# Patient Record
Sex: FEMALE | Race: WHITE | Employment: OTHER
[De-identification: names, ages, dates, MRNs, and addresses within clinical notes are randomized per-mention and may not be internally consistent; named-entity substitution may affect disease eponyms.]

---

## 2021-03-25 ENCOUNTER — NURSE TRIAGE (OUTPATIENT)
Dept: OTHER | Facility: CLINIC | Age: 56
End: 2021-03-25

## 2021-03-25 NOTE — TELEPHONE ENCOUNTER
Patient called [facility] with red flag complaint to establish care with [facility] or [facility]. Brief description of triage: Vertigo since Saturday night. Triage indicates for patient to see PCP within 24 hours. Advised patient to go to urgent care today if unable to get appointment. Care advice provided, patient verbalizes understanding; denies any other questions or concerns; instructed to call back for any new or worsening symptoms. Writer provided warm transfer to [facility] at [facility] for appointment scheduling. Attention Provider: Thank you for allowing me to participate in the care of your patient. The patient was connected to triage in response to information provided to the [facility]. Please do not respond through this encounter as the response is not directed to a shared pool. Unable to route to PCP. Reason for Disposition   [1] MODERATE dizziness (e.g., vertigo; feels very unsteady, interferes with normal activities) AND [2] has NOT been evaluated by physician for this    Answer Assessment - Initial Assessment Questions  1. DESCRIPTION: \"Describe your dizziness. \"      Sitting up straight, very tired, feels nauseous and dizzy when standing up or moving, feels spinning sensation when lying down    2. VERTIGO: \"Do you feel like either you or the room is spinning or tilting?\"       Spinning when lying down    3. LIGHTHEADED: \"Do you feel lightheaded? \" (e.g., somewhat faint, woozy, weak upon standing)      A little bit    4. SEVERITY: \"How bad is it? \"  \"Can you walk? \"    - MILD - Feels unsteady but walking normally. - MODERATE - Feels very unsteady when walking, but not falling; interferes with normal activities (e.g., school, work) . - SEVERE - Unable to walk without falling (requires assistance). Moderate - drifting to one side when trying to walk    5. ONSET:  \"When did the dizziness begin? \"      Saturday night    6.  AGGRAVATING FACTORS: \"Does anything make it worse? \" (e.g., standing, change in head position)      Moving, standing, lying down    7. CAUSE: \"What do you think is causing the dizziness? \"      Vertigo    8. RECURRENT SYMPTOM: \"Have you had dizziness before? \" If so, ask: \"When was the last time? \" \"What happened that time? \"      Yes    9. OTHER SYMPTOMS: \"Do you have any other symptoms? \" (e.g., headache, weakness, numbness, vomiting, earache)      No    10. PREGNANCY: \"Is there any chance you are pregnant? \" \"When was your last menstrual period? \"        N/A    Protocols used: DIZZINESS - VERTIGO-ADULT-AH

## 2021-03-26 ENCOUNTER — HOSPITAL ENCOUNTER (EMERGENCY)
Age: 56
Discharge: HOME | End: 2021-03-26
Attending: EMERGENCY MEDICINE
Payer: COMMERCIAL

## 2021-03-26 ENCOUNTER — APPOINTMENT (OUTPATIENT)
Dept: CT IMAGING | Age: 56
End: 2021-03-26
Payer: COMMERCIAL

## 2021-03-26 VITALS
SYSTOLIC BLOOD PRESSURE: 170 MMHG | BODY MASS INDEX: 44.39 KG/M2 | HEART RATE: 78 BPM | HEIGHT: 64 IN | TEMPERATURE: 98.1 F | RESPIRATION RATE: 14 BRPM | DIASTOLIC BLOOD PRESSURE: 85 MMHG | WEIGHT: 260 LBS | OXYGEN SATURATION: 98 %

## 2021-03-26 DIAGNOSIS — R42 DIZZINESS: Primary | ICD-10-CM

## 2021-03-26 LAB
A/G RATIO: 1.4 (ref 1.1–2.2)
ALBUMIN SERPL-MCNC: 4.4 G/DL (ref 3.4–5)
ALP BLD-CCNC: 105 U/L (ref 40–129)
ALT SERPL-CCNC: 37 U/L (ref 10–40)
ANION GAP SERPL CALCULATED.3IONS-SCNC: 10 MMOL/L (ref 3–16)
AST SERPL-CCNC: 33 U/L (ref 15–37)
BASOPHILS ABSOLUTE: 0.1 K/UL (ref 0–0.2)
BASOPHILS RELATIVE PERCENT: 1.4 %
BILIRUB SERPL-MCNC: 0.3 MG/DL (ref 0–1)
BILIRUBIN URINE: NEGATIVE
BLOOD, URINE: NEGATIVE
BUN BLDV-MCNC: 13 MG/DL (ref 7–20)
CALCIUM SERPL-MCNC: 9.9 MG/DL (ref 8.3–10.6)
CHLORIDE BLD-SCNC: 100 MMOL/L (ref 99–110)
CLARITY: CLEAR
CO2: 27 MMOL/L (ref 21–32)
COLOR: YELLOW
CREAT SERPL-MCNC: 0.7 MG/DL (ref 0.6–1.1)
EOSINOPHILS ABSOLUTE: 0.4 K/UL (ref 0–0.6)
EOSINOPHILS RELATIVE PERCENT: 3.7 %
GFR AFRICAN AMERICAN: >60
GFR NON-AFRICAN AMERICAN: >60
GLOBULIN: 3.1 G/DL
GLUCOSE BLD-MCNC: 126 MG/DL (ref 70–99)
GLUCOSE URINE: NEGATIVE MG/DL
HCT VFR BLD CALC: 37.7 % (ref 36–48)
HEMOGLOBIN: 12.9 G/DL (ref 12–16)
KETONES, URINE: NEGATIVE MG/DL
LEUKOCYTE ESTERASE, URINE: NEGATIVE
LYMPHOCYTES ABSOLUTE: 2.5 K/UL (ref 1–5.1)
LYMPHOCYTES RELATIVE PERCENT: 25.9 %
MCH RBC QN AUTO: 31.9 PG (ref 26–34)
MCHC RBC AUTO-ENTMCNC: 34.2 G/DL (ref 31–36)
MCV RBC AUTO: 93 FL (ref 80–100)
MICROSCOPIC EXAMINATION: NORMAL
MONOCYTES ABSOLUTE: 0.6 K/UL (ref 0–1.3)
MONOCYTES RELATIVE PERCENT: 6.3 %
NEUTROPHILS ABSOLUTE: 6.1 K/UL (ref 1.7–7.7)
NEUTROPHILS RELATIVE PERCENT: 62.7 %
NITRITE, URINE: NEGATIVE
PDW BLD-RTO: 13.4 % (ref 12.4–15.4)
PH UA: 5 (ref 5–8)
PLATELET # BLD: 290 K/UL (ref 135–450)
PMV BLD AUTO: 8.8 FL (ref 5–10.5)
POTASSIUM REFLEX MAGNESIUM: 4.1 MMOL/L (ref 3.5–5.1)
PRO-BNP: 43 PG/ML (ref 0–124)
PROTEIN UA: NEGATIVE MG/DL
RBC # BLD: 4.06 M/UL (ref 4–5.2)
SODIUM BLD-SCNC: 137 MMOL/L (ref 136–145)
SPECIFIC GRAVITY UA: 1.02 (ref 1–1.03)
TOTAL PROTEIN: 7.5 G/DL (ref 6.4–8.2)
TROPONIN: <0.01 NG/ML
URINE REFLEX TO CULTURE: NORMAL
URINE TYPE: NORMAL
UROBILINOGEN, URINE: 0.2 E.U./DL
WBC # BLD: 9.8 K/UL (ref 4–11)

## 2021-03-26 PROCEDURE — 70450 CT HEAD/BRAIN W/O DYE: CPT

## 2021-03-26 PROCEDURE — 70498 CT ANGIOGRAPHY NECK: CPT

## 2021-03-26 PROCEDURE — 6360000004 HC RX CONTRAST MEDICATION: Performed by: EMERGENCY MEDICINE

## 2021-03-26 PROCEDURE — 84484 ASSAY OF TROPONIN QUANT: CPT

## 2021-03-26 PROCEDURE — 2580000003 HC RX 258: Performed by: EMERGENCY MEDICINE

## 2021-03-26 PROCEDURE — 85025 COMPLETE CBC W/AUTO DIFF WBC: CPT

## 2021-03-26 PROCEDURE — 81003 URINALYSIS AUTO W/O SCOPE: CPT

## 2021-03-26 PROCEDURE — 83880 ASSAY OF NATRIURETIC PEPTIDE: CPT

## 2021-03-26 PROCEDURE — 80053 COMPREHEN METABOLIC PANEL: CPT

## 2021-03-26 PROCEDURE — 93005 ELECTROCARDIOGRAM TRACING: CPT | Performed by: EMERGENCY MEDICINE

## 2021-03-26 PROCEDURE — 99283 EMERGENCY DEPT VISIT LOW MDM: CPT

## 2021-03-26 RX ORDER — MECLIZINE HYDROCHLORIDE 25 MG/1
25 TABLET ORAL 3 TIMES DAILY PRN
Qty: 15 TABLET | Refills: 0 | Status: SHIPPED | OUTPATIENT
Start: 2021-03-26 | End: 2021-03-31

## 2021-03-26 RX ORDER — MECLIZINE HCL 12.5 MG/1
25 TABLET ORAL ONCE
Status: COMPLETED | OUTPATIENT
Start: 2021-03-26 | End: 2021-03-26

## 2021-03-26 RX ORDER — 0.9 % SODIUM CHLORIDE 0.9 %
1000 INTRAVENOUS SOLUTION INTRAVENOUS ONCE
Status: COMPLETED | OUTPATIENT
Start: 2021-03-26 | End: 2021-03-26

## 2021-03-26 RX ADMIN — MECLIZINE HCL 25 MG: 12.5 TABLET ORAL at 20:04

## 2021-03-26 RX ADMIN — SODIUM CHLORIDE 1000 ML: 9 INJECTION, SOLUTION INTRAVENOUS at 20:04

## 2021-03-26 RX ADMIN — IOPAMIDOL 75 ML: 755 INJECTION, SOLUTION INTRAVENOUS at 19:54

## 2021-03-26 ASSESSMENT — ENCOUNTER SYMPTOMS
VOMITING: 0
WHEEZING: 0
DIARRHEA: 0
CHEST TIGHTNESS: 0
COUGH: 0
ABDOMINAL PAIN: 0
SHORTNESS OF BREATH: 0
STRIDOR: 0

## 2021-03-26 NOTE — ED NOTES
Pt state that she has been dealing with vertigo off and on for 6 months and that usually she goes to the chiropractor and it is gone in a couple of days. Pt state that yesterday she went on a long drive with some friend that triggered her vertigo back up so she made and apt and went to her chiropractor today. Pt state that he did her normal treatment. Pt state that after her treatment she felt flush and her mom was concerned so she called urgent care and they told her to come to the ER to get checked out. Pt state that she no longer feels flush and her vertigo is almost completely gone she states maybe just a little if she turns quickly. No room spinning and no dizziness when she looks side to side only if she does it fast and then a small amount. Pt state normally after her treatment by day 2 it is all gone.          Aydee Millard, MICHAEL  03/26/21 0392

## 2021-03-26 NOTE — ED PROVIDER NOTES
[facility]  ED  EMERGENCYDEPARTMENT ENCOUNTER      Pt Name: Dylan Gandhi  MRN: [id number]  [place] 1965  Date of evaluation: 3/26/2021  Richmond Cole MD    CHIEF COMPLAINT       Chief Complaint   Patient presents with    Dizziness     Pt states she has had vertigo since last weekend. Pt states she went to chiropractor at [phone] today for tx for vertigo. States she was flushed after treatment. Does not feel flushed now, feels better, but Urgent Care told her to come for eval.         HISTORY OF PRESENT ILLNESS   (Location/Symptom, Timing/Onset,Context/Setting, Quality, Duration, Modifying Factors, Severity)  Note limiting factors. Dylan Gandhi is a 63 y.o. female who presents to the emergency department for dizziness. Patient reports she's had dizziness since Saturday night, seems to come and go, aggravated with bending down, laying down or trying to get up. She went to chiropractor today to get neck adjusted and afterwards states she was 'really flushed' and felt worse. Called urgent care and was told to go to the ER to get checked. Currently states dizziness has improved though still there.   -States she has history of vertigo (off and on for six months), described as room spinning and when it started on Saturday it felt similar though seemed more severe. Reports nausea, denies emesis, chest pain, shortness of breath, weakness, blurry vision, numbness    HPI    Nursing Notes were reviewed. REVIEW OF SYSTEMS    (2-9 systems for level 4, 10 or more for level 5)     Review of Systems   Constitutional: Negative for activity change, appetite change, diaphoresis and fever. Respiratory: Negative for cough, chest tightness, shortness of breath, wheezing and stridor. Cardiovascular: Negative for chest pain and palpitations. Gastrointestinal: Negative for abdominal pain, diarrhea and vomiting. Genitourinary: Negative for dysuria and flank pain. Skin: Negative for rash and wound. Neurological: Positive for dizziness. Negative for weakness, light-headedness, numbness and headaches. Except as noted above the remainder of the review of systems was reviewedand negative. PAST MEDICAL HISTORY     Past Medical History:   Diagnosis Date    Asthma     Cholelithiasis     Migraine aura, persistent     Nausea & vomiting          SURGICAL HISTORY       Past Surgical History:   Procedure Laterality Date    CHOLECYSTECTOMY      CHOLECYSTECTOMY      ENDOMETRIAL ABLATION      KNEE ARTHROSCOPY Right 12/3/13    VAS partial menisectomy partial lateral meniscectomy, chondroplasty synovectomy    TONSILLECTOMY      WRIST SURGERY  1/13    rt wrist         CURRENT MEDICATIONS       Discharge Medication List as of 3/26/2021  9:52 PM      CONTINUE these medications which have NOT CHANGED    Details   ! ! predniSONE (DELTASONE) 10 MG tablet 3 po bid x 2 days, then 2 po bid x 2 days, then 1 po bid x 2 days, then 1 po qd x 2 days, Disp-26 tablet, R-0      ondansetron (ZOFRAN) 4 MG tablet Take 1 tablet by mouth every 8 hours as needed for Nausea or Vomiting, Disp-18 tablet, R-0      pantoprazole (PROTONIX) 20 MG tablet Take 1 tablet by mouth daily, Disp-30 tablet, R-0      VITAMIN C, CALCIUM ASCORBATE, PO Take by mouth      Cholecalciferol (THERA-D SPORT) 2000 UNITS TABS Take by mouth      GARLIC PO Take by mouth      !! IBUPROFEN PO Take by mouth      mometasone (NASONEX) 50 MCG/ACT nasal spray 2 sprays each nostril daily, Starting 3/23/2015, Until Discontinued, Historical Med      mometasone-formoterol (DULERA) 200-5 MCG/ACT inhaler Inhale 1 puff into the lungs      Multiple Vitamins-Minerals (MULTIVITAMIN PO) Take by mouth      pravastatin (PRAVACHOL) 20 MG tablet Take 20 mg by mouth      !! predniSONE (DELTASONE) 10 MG tablet 3 po bid x 2 days, then 2 po bid x 2 days, then 1 po bid x 2 days, then 1 po qd x 2 days, Disp-26 tablet, R-0      budesonide-formoterol (SYMBICORT) 80-4.5 MCG/ACT AERO Inhale 2 puffs into the lungs 2 times daily. promethazine (PHENERGAN) 6.25 MG/5ML syrup Take 2.5 mg/mL by mouth 4 times daily as needed for Nausea. famotidine (PEPCID) 20 MG tablet Take 1 tablet by mouth 2 times daily. , Disp-6 tablet, R-0      HYDROcodone-acetaminophen (NORCO) 5-325 MG per tablet Take 1-2 tablets by mouth every 4 hours as needed for Pain., Disp-40 tablet, R-0      !! ibuprofen (IBU) 600 MG tablet Take 1 tablet by mouth every 6 hours as needed for Pain., Disp-40 tablet, R-0      lisinopril (PRINIVIL;ZESTRIL) 10 MG tablet Take 10 mg by mouth daily. Pt unsure of dose only taking for 7 days preop      albuterol (PROVENTIL HFA;VENTOLIN HFA) 108 (90 BASE) MCG/ACT inhaler Inhale 2 puffs into the lungs every 6 hours as needed. !! - Potential duplicate medications found. Please discuss with provider. ALLERGIES     Clarithromycin, Adhesive tape, and Sulfa antibiotics    FAMILY HISTORY     No family history on file.        SOCIAL HISTORY       Social History     Socioeconomic History    Marital status: Single     Spouse name: Not on file    Number of children: Not on file    Years of education: Not on file    Highest education level: Not on file   Occupational History    Not on file   Social Needs    Financial resource strain: Not on file    Food insecurity     Worry: Not on file     Inability: Not on file    Transportation needs     Medical: Not on file     Non-medical: Not on file   Tobacco Use    Smoking status: Never Smoker    Smokeless tobacco: Never Used   Substance and Sexual Activity    Alcohol use: No    Drug use: No    Sexual activity: Yes     Partners: Male   Lifestyle    Physical activity     Days per week: Not on file     Minutes per session: Not on file    Stress: Not on file   Relationships    Social connections     Talks on phone: Not on file     Gets together: Not on file     Attends Mandaeism service: Not on file     Active member of club or organization: Not on file     Attends meetings of clubs or organizations: Not on file     Relationship status: Not on file    Intimate partner violence     Fear of current or ex partner: Not on file     Emotionally abused: Not on file     Physically abused: Not on file     Forced sexual activity: Not on file   Other Topics Concern    Not on file   Social History Narrative    Not on file       SCREENINGS   NIH Stroke Scale  NIH Stroke Scale Assessed: Yes  Interval: Baseline  Level of Consciousness (1a. ): Alert  LOC Questions (1b. ): Answers both correctly  LOC Commands (1c. ): Performs both tasks correctly  Best Gaze (2. ): Normal  Visual (3. ): No visual loss  Facial Palsy (4. ): Normal symmetrical movement  Motor Arm, Left (5a. ): No drift  Motor Arm, Right (5b. ): No drift  Motor Leg, Left (6a. ): No drift  Motor Leg, Right (6b. ): No drift  Limb Ataxia (7. ): Absent  Sensory (8. ): Normal  Best Language (9. ): No aphasia  Dysarthria (10. ): Normal  Extinction and Inattention (11): No abnormality  Total: 0         PHYSICAL EXAM    (up to 7 for level 4, 8 ormore for level 5)     ED Triage Vitals [03/26/21 1750]   BP Temp Temp Source Pulse Resp SpO2 Height Weight   (!) 190/86 98.1 °F (36.7 °C) Oral 85 18 96 % 5' 4\" (1.626 m) 260 lb (117.9 kg)       Physical Exam  Constitutional:       General: She is not in acute distress. Appearance: Normal appearance. She is well-developed. She is not ill-appearing or toxic-appearing. Comments: Sitting in bed comfortably, speaking in full sentences, following verbal commands appropriately. Not in acute distress     HENT:      Head: Normocephalic and atraumatic. Eyes:      Conjunctiva/sclera: Conjunctivae normal.      Pupils: Pupils are equal, round, and reactive to light. Neck:      Musculoskeletal: Normal range of motion and neck supple. Cardiovascular:      Rate and Rhythm: Normal rate and regular rhythm. Heart sounds: Normal heart sounds. No murmur.  No friction rub. No gallop. Pulmonary:      Effort: Pulmonary effort is normal. No respiratory distress. Breath sounds: Normal breath sounds. No decreased breath sounds, wheezing, rhonchi or rales. Abdominal:      General: Bowel sounds are normal. There is no distension. Palpations: Abdomen is soft. Tenderness: There is no abdominal tenderness. There is no guarding or rebound. Musculoskeletal: Normal range of motion. General: No tenderness or deformity. Skin:     General: Skin is warm and dry. Findings: No rash. Neurological:      Mental Status: She is alert and oriented to person, place, and time. GCS: GCS eye subscore is 4. GCS verbal subscore is 5. GCS motor subscore is 6. Cranial Nerves: No cranial nerve deficit. Sensory: No sensory deficit. Comments: No facial droop; slurred speech, or protonator drift noted. Finger to nose, rapid alternating and heel to shin intact bilaterally     -Negative gait or truncal ataxia. Negative test of skew. Psychiatric:         Behavior: Behavior is cooperative. DIAGNOSTIC RESULTS     EKG: All EKG's are interpreted by the Emergency Department Physicianwho either signs or Co-signs this chart in the absence of a cardiologist.    The Ekg interpreted by me shows  normal sinus rhythm with a rate of 78  Axis is   Normal  QTc is  444  Intervals and Durations are unremarkable. ST Segments: normal  No prior ekg    RADIOLOGY:   Non-plain film images such as CT, Ultrasound and MRI are read by the radiologist. Plain radiographic images are visualized and preliminarily interpreted by the emergency physician with the below findings:      Interpretation per the Radiologist below, if available at the time of this note:    CT Head WO Contrast   Final Result   1. No acute intracranial abnormality   2. No acute arterial abnormality or hemodynamically significant arterial   stenosis in the head or neck.    3. Borderline Chiari 1 malformation. Nonemergent follow-up outpatient brain   MRI is recommended for further evaluation if not previously performed. 4. Multilevel degenerative changes in the cervical spine. CTA HEAD NECK W CONTRAST   Final Result   1. No acute intracranial abnormality   2. No acute arterial abnormality or hemodynamically significant arterial   stenosis in the head or neck. 3. Borderline Chiari 1 malformation. Nonemergent follow-up outpatient brain   MRI is recommended for further evaluation if not previously performed. 4. Multilevel degenerative changes in the cervical spine. ED BEDSIDE ULTRASOUND:   Performed by ED Physician - none    LABS:  Labs Reviewed   COMPREHENSIVE METABOLIC PANEL W/ REFLEX TO MG FOR LOW K - Abnormal; Notable for the following components:       Result Value    Glucose 126 (*)     All other components within normal limits    Narrative:     Performed at:  [place] [address] [employer]   Phone [phone]   CBC WITH AUTO DIFFERENTIAL    Narrative:     Performed at:  [place] [address] [employer]   Phone [phone]   TROPONIN    Narrative:     Performed at:  [address] [employer]   Phone [phone] PEPTIDE    Narrative:     Performed at:  [address], [facility] [employer]   Phone [phone]   URINE RT REFLEX TO CULTURE    Narrative:     Performed at:  [address], [facility] [employer]   Phone [phone]       All other labs were within normal range ornot returned as of this dictation.     EMERGENCY DEPARTMENT COURSE and DIFFERENTIAL DIAGNOSIS/MDM:   Vitals:    Vitals:    03/26/21 2035 03/26/21 2105 03/26/21 2110 03/26/21 2135   BP: (!) 168/53 (!) 183/101 (!) 171/83 (!) 170/85   Pulse: 77 84 79 78   Resp: 12 22 18 14   Temp: 98.1 °F (36.7 °C) 98.1 °F (36.7 °C) 98.1 °F (36.7 °C) 98.1 °F (36.7 °C)   TempSrc:       SpO2: 98% 98% 100% 98%   Weight:       Height:             [facility]    ED COURSE/[facility]    -Flores Dolan is a 63 y.o. female with no significant medical history who presents to ED for dizziness. Patient states it started 6 months ago off and on, with room spinning and thought it was vertigo. Similar episode started on Saturday, intermittent aggravated with bending down, laying down or trying to stand up, improved with Dramamine. She went to chiropractor however after procedure, states that it felt worse and her face was flushed. She try to go to urgent care however they informed her that she needs to go to the ER for evaluation. Upon arrival patient was afebrile with elevated blood pressure. She was alert and oriented, no focal deficits. NIH equals 0.  -Lab work was within normal limits. Patient was given IV fluid bolus as well as meclizine. UA without any evidence of acute infection. CT head/CTA head and neck showed no acute intracranial normality. No acute arterial abnormality or hemodynamically significant arterial stenosis in the head or neck. Borderline Chiari I malformation. Nonemergent follow-up outpatient brain MRI is recommended for further evaluation if not previously performed. Multilevel degenerative changes in the cervical spine.   -Labs and imaging reviewed and results discussed with patient. on reassessment patient reports she is feeling improved though when she bent over in the bathroom states that she got dizzy. Feels like the fluids helped. Discuss diagnosis of dizziness at length including friend shows of benign positional vertigo versus other inner ear etiologies and stroke. At this point my suspicion for stroke is low though cannot entirely rule it out with just a CT scan.   On reassessment patient is feeling much improved and symptoms have been

## 2021-03-27 LAB
EKG ATRIAL RATE: 78 BPM
EKG DIAGNOSIS: NORMAL
EKG P AXIS: 36 DEGREES
EKG P-R INTERVAL: 154 MS
EKG Q-T INTERVAL: 390 MS
EKG QRS DURATION: 78 MS
EKG QTC CALCULATION (BAZETT): 444 MS
EKG R AXIS: 19 DEGREES
EKG T AXIS: 33 DEGREES
EKG VENTRICULAR RATE: 78 BPM

## 2021-03-27 PROCEDURE — 93010 ELECTROCARDIOGRAM REPORT: CPT | Performed by: INTERNAL MEDICINE

## 2021-03-30 ENCOUNTER — TELEPHONE (OUTPATIENT)
Dept: FAMILY MEDICINE CLINIC | Age: 56
End: 2021-03-30

## 2021-03-30 NOTE — TELEPHONE ENCOUNTER
I have rev'd her history, and she will need 2 separate appointments for this.   She will need to establish care prior to her pre op exam.

## 2021-03-30 NOTE — TELEPHONE ENCOUNTER
----- Message from Zen Burkett sent at 3/30/2021 11:46 AM EDT -----  Subject: Message to Provider    QUESTIONS  Information for Provider? PT has a new patient appointment on 4/12 with   Chad Lao but also needs a physical scheduled for procedure on 4/15   please advise.   ---------------------------------------------------------------------------  --------------  CALL BACK INFO  What is the best way for the office to contact you? OK to leave message on   voicemail  Preferred Call Back Phone Number? [phone]  ---------------------------------------------------------------------------  --------------  SCRIPT ANSWERS  Relationship to Patient?  Self

## 2021-04-04 NOTE — PROGRESS NOTES
compliance, Type A tympanogram, consistent with normal middle ear function. Reliability: Good  Transducer: Inserts    See scanned audiogram dated 4/6/2021  for results. PATIENT EDUCATION:       The following items were discussed with the patient:    - Good Communication Strategies    - Dizziness    Educational information was shared in the After Visit Summary. RECOMMENDATIONS:                                                                                                                                                                                                                                                            The following items are recommended based on patient report and results from today's appointment:   - Continue medical follow-up with Alexis Diez MD.   - Retest hearing as medically indicated and/or sooner if a change in hearing is noted. - Utilize \"Good Communication Strategies\" as discussed to assist in speech understanding with communication partners. - If medically indicated, consider vestibular evaluation to further investigate symptoms of dizziness.        Anjali Tellez  Audiologist    Chart CC'd to: Alexis Diez MD      Degree of   Hearing Sensitivity dB Range   Within Normal Limits (WNL) 0 - 20   Mild 20 - 40   Moderate 40 - 55   Moderately-Severe 55 - 70   Severe 70 - 90   Profound 90 +

## 2021-04-06 ENCOUNTER — PROCEDURE VISIT (OUTPATIENT)
Dept: AUDIOLOGY | Age: 56
End: 2021-04-06
Payer: COMMERCIAL

## 2021-04-06 ENCOUNTER — OFFICE VISIT (OUTPATIENT)
Dept: ENT CLINIC | Age: 56
End: 2021-04-06
Payer: COMMERCIAL

## 2021-04-06 VITALS — BODY MASS INDEX: 44.39 KG/M2 | HEIGHT: 64 IN | WEIGHT: 260 LBS | TEMPERATURE: 98 F

## 2021-04-06 DIAGNOSIS — H81.13 BENIGN PAROXYSMAL POSITIONAL VERTIGO DUE TO BILATERAL VESTIBULAR DISORDER: Primary | ICD-10-CM

## 2021-04-06 DIAGNOSIS — H61.23 BILATERAL IMPACTED CERUMEN: ICD-10-CM

## 2021-04-06 DIAGNOSIS — R42 DIZZINESS AND GIDDINESS: Primary | ICD-10-CM

## 2021-04-06 DIAGNOSIS — R42 VERTIGO: ICD-10-CM

## 2021-04-06 DIAGNOSIS — H93.13 TINNITUS AURIUM, BILATERAL: ICD-10-CM

## 2021-04-06 PROCEDURE — 99203 OFFICE O/P NEW LOW 30 MIN: CPT | Performed by: OTOLARYNGOLOGY

## 2021-04-06 PROCEDURE — 92557 COMPREHENSIVE HEARING TEST: CPT | Performed by: AUDIOLOGIST

## 2021-04-06 PROCEDURE — 95992 CANALITH REPOSITIONING PROC: CPT | Performed by: OTOLARYNGOLOGY

## 2021-04-06 PROCEDURE — 92567 TYMPANOMETRY: CPT | Performed by: AUDIOLOGIST

## 2021-04-06 PROCEDURE — 69210 REMOVE IMPACTED EAR WAX UNI: CPT | Performed by: OTOLARYNGOLOGY

## 2021-04-06 RX ORDER — OMEPRAZOLE 10 MG/1
20 CAPSULE, DELAYED RELEASE ORAL DAILY
COMMUNITY
End: 2021-08-31

## 2021-04-06 RX ORDER — NYSTATIN 100000 [USP'U]/G
POWDER TOPICAL
COMMUNITY
Start: 2020-04-03 | End: 2021-06-03

## 2021-04-06 RX ORDER — MELOXICAM 15 MG/1
TABLET ORAL
COMMUNITY
Start: 2021-01-26 | End: 2021-04-07

## 2021-04-06 RX ORDER — ONDANSETRON 4 MG/1
4 TABLET, FILM COATED ORAL 3 TIMES DAILY PRN
Qty: 30 TABLET | Refills: 0 | Status: SHIPPED | OUTPATIENT
Start: 2021-04-06 | End: 2021-04-13

## 2021-04-06 NOTE — PROGRESS NOTES
[address]- HEAD & NECK SURGERY  NEW PATIENT HISTORY AND PHYSICAL NOTE      Patient Name: Shelia Haro Record Number:  [id number]  Primary Care Physician:  No primary care provider on file. ChiefComplaint     Chief Complaint   Patient presents with    Dizziness     Pt states she has been experiencing vertigo for the past 6 months but it has been getting worse the past 3 weeks. Pt states quick movements can trigger the vertigo and sometimes these episodes last for hours. History of Present Illness     Sanju Wise is an 63 y.o. female with concern for dizziness - states tahmina vertigo that began approximately 3 weeks ago, with objects spinning, lightheadedness and nausea triggered by positional changes (laying down, bending over, moving head left/right); vertigo lasts 30-60s. Symptoms are alleviated by remaining stationary. States tinnitus at baseline but does not worsen with episodes of dizziness. She has no otorrhea, otalgia, aural fullness, hearing loss. Has been prescribed meclizine howeverr no improvement. No history of head trauma, middle ear disease; has history of migraine headaches - occur 2-3 times yearly, does not take prophylactic medication - does not experience worse headaches with vertigo. Past Medical History     Past Medical History:   Diagnosis Date    Asthma     Cholelithiasis     Dizziness     Migraine aura, persistent     Nausea & vomiting        Past Surgical History     Past Surgical History:   Procedure Laterality Date    CHOLECYSTECTOMY      CHOLECYSTECTOMY      ENDOMETRIAL ABLATION      KNEE ARTHROSCOPY Right 12/3/13    VAS partial menisectomy partial lateral meniscectomy, chondroplasty synovectomy    TONSILLECTOMY      WRIST SURGERY  1/13    rt wrist       Family History     History reviewed. No pertinent family history.     Social History     Social History     Tobacco Use    Smoking status: Never Smoker    Smokeless tobacco: Never Used   Substance Use Topics    Alcohol use: No    Drug use: No        Allergies     Allergies   Allergen Reactions    Clarithromycin Hives and Rash    Adhesive Tape Rash    Sulfa Antibiotics Hives, Itching and Rash       Medications     Current Outpatient Medications   Medication Sig Dispense Refill    Acetaminophen (TYLENOL 8 HOUR ARTHRITIS PAIN PO)       meloxicam (MOBIC) 15 MG tablet       nystatin (NYAMYC) 058781 UNIT/GM powder       omeprazole (PRILOSEC) 10 MG delayed release capsule Take 20 mg by mouth daily      ondansetron (ZOFRAN) 4 MG tablet Take 1 tablet by mouth 3 times daily as needed for Nausea or Vomiting 30 tablet 0    MECLIZINE HCL PO       predniSONE (DELTASONE) 10 MG tablet 3 po bid x 2 days, then 2 po bid x 2 days, then 1 po bid x 2 days, then 1 po qd x 2 days 26 tablet 0    pantoprazole (PROTONIX) 20 MG tablet Take 1 tablet by mouth daily 30 tablet 0    VITAMIN C, CALCIUM ASCORBATE, PO Take by mouth      Cholecalciferol (THERA-D SPORT) 2000 UNITS TABS Take by mouth      GARLIC PO Take by mouth      IBUPROFEN PO Take by mouth      mometasone (NASONEX) 50 MCG/ACT nasal spray 2 sprays each nostril daily      mometasone-formoterol (DULERA) 200-5 MCG/ACT inhaler Inhale 1 puff into the lungs      Multiple Vitamins-Minerals (MULTIVITAMIN PO) Take by mouth      pravastatin (PRAVACHOL) 20 MG tablet Take 20 mg by mouth      predniSONE (DELTASONE) 10 MG tablet 3 po bid x 2 days, then 2 po bid x 2 days, then 1 po bid x 2 days, then 1 po qd x 2 days 26 tablet 0    budesonide-formoterol (SYMBICORT) 80-4.5 MCG/ACT AERO Inhale 2 puffs into the lungs 2 times daily.  promethazine (PHENERGAN) 6.25 MG/5ML syrup Take 2.5 mg/mL by mouth 4 times daily as needed for Nausea.  famotidine (PEPCID) 20 MG tablet Take 1 tablet by mouth 2 times daily. 6 tablet 0    HYDROcodone-acetaminophen (NORCO) 5-325 MG per tablet Take 1-2 tablets by mouth every 4 hours as needed for Pain. 40 tablet 0    ibuprofen (IBU) 600 MG tablet Take 1 tablet by mouth every 6 hours as needed for Pain. 40 tablet 0    lisinopril (PRINIVIL;ZESTRIL) 10 MG tablet Take 10 mg by mouth daily. Pt unsure of dose only taking for 7 days preop      albuterol (PROVENTIL HFA;VENTOLIN HFA) 108 (90 BASE) MCG/ACT inhaler Inhale 2 puffs into the lungs every 6 hours as needed. No current facility-administered medications for this visit.         Review of Systems     REVIEW OF SYSTEMS  The following systems were reviewed and revealed the following in addition to any already discussed in the HPI:    CONSTITUTIONAL: no weight loss, no fever, no night sweats, no chills  EYES: no vision changes, no blurry vision  EARS: no changes in hearing, no otalgia  NOSE: no epistaxis, no rhinorrhea  RESPIRATORY: no difficulty breathing, no shortness of breath  CV: no chest pain, no peripheral vascular disease  HEME: No coagulation disorder, no bleeding disorder  NEURO: No TIA or stroke-like symptoms  SKIN: No new rashes in the head and neck, no recent skin cancers  MOUTH: No new ulcers, no recent teeth infections  GASTROINTESTINAL: No diarrhea, stomach pain  PSYCH: No anxiety, no depression    PhysicalExam     Vitals:    04/06/21 1018   Site: Right Upper Arm   Position: Sitting   Cuff Size: Large Adult   Temp: 98 °F (36.7 °C)   TempSrc: Infrared   Weight: 260 lb (117.9 kg)   Height: 5' 4\" (1.626 m)       PHYSICAL EXAM  Temp 98 °F (36.7 °C) (Infrared)   Ht 5' 4\" (1.626 m)   Wt 260 lb (117.9 kg)   LMP 11/02/2015   BMI 44.63 kg/m²     GENERAL: No Acute Distress, Alert and Oriented, no hoarseness  EYES: EOMI, Anti-icteric  NOSE: On anterior rhinoscopy there is no epistaxis, nasal mucosa within normal limits, no purulent drainage  EARS: Normal external appearance; on portable otomicroscopy there is bilateral cerumen impaction  FACE: 1/6 House-Brackmann Scale, symmetric, sensation equal bilaterally  ORAL CAVITY: No masses or lesions palpated, uvula is midline, moist mucous membranes, 0+ tonsils, absent dentition  NECK: Normal range of motion, no thyromegaly, trachea is midline, no lymphadenopathy, no neck masses, no crepitus  CHEST: Normal respiratory effort, no retractions, breathing comfortably  SKIN: No rashes, normal appearing skin, no evidence of skin lesions/tumors  NEURO: CN 2, 3, 4, 5, 6, 7, 11, 12 intact bilaterally   -[place]-Hallpike testing (posterior semicircular canal testing): Patient with geotropic torsional nystagmus to the right on right Julianne-Hallpike testing; to the left (worse side) with left [place]-Hallpike maneuver. Barbecue roll maneuver for the left side was performedpatient with emesis during procedure.  -Supine head roll (lateral semicircular canal testing): Lateral nystagmus appreciated bilaterally  -Head impulse testing (VOR function): Not tested  -Nystagmus testing (primary, central, left gaze): No gaze paretic nystagmus  -Skew deviation/vertical dysconjugate gaze: Not tested  -Finger-nose testing: Not tested  -Romberg testing: No significant swaying with Romberg testing  -Fukuda step test: Not tested      Data/Imaging Review            Procedure     Cerumen Debridement    Pre op: Cerumen impaction of the Bilateral ears. Post op: Normal ear examination  Procedure : Binocular otomicroscopy with debridement of cerumen  Surgeon: JATIN Mora  Estimated Blood Loss: None    Procedure:   Binocular otomicroscopy with debridement of cerumen impaction    After obtaining consent, the patient was placed in the examination chair in the reclined position.      -Right ear: External auditory canal with mild stenosis, canal skin with occluding cerumen, removed with suction and cup forceps. Right tympanic membrane visible without without significant retractions or cholesteatoma identified, no middle ear effusions.   -Left ear: External auditory canal with mild stenosis, canal skin with occluding cerumen, removed with suction cup forceps.  Left tympanic membrane visible without without significant retractions or cholesteatoma identified, no middle ear effusions. * Patient tolerated the procedure well with no complications      Assessment and Plan     1. 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 Geotropic torsional nystagmus noted on Bode-Hallpike testing bilaterally, however left side is worse side. She may also have a component of right posterior semicircular canal BPPV although we believe most of her disease is left-sided. She also has left lateral semicircular canal BPPV which we managed with the barbecue roll maneuver today. She has significant distress from her condition with intermittent nauseawe will prescribe Zofran ODT on an as-needed basis. We have also referred her to balance physical therapy for further testing and management. If no improvement after 1 month, she is to notify the ENT clinic; we will perform VNG and further evaluations at that time. - [facility]  - ondansetron (ZOFRAN) 4 MG tablet; Take 1 tablet by mouth 3 times daily as needed for Nausea or Vomiting  Dispense: 30 tablet; Refill: 0    2. Bilateral impacted cerumen  Patient with bilateral impacted cerumen managed with debridement under binocular otomicroscopy in the clinic today. She may use mineral oil or peroxide drops for further management    3. Tinnitus aurium, bilateral  Patient with bilateral tinnitus, intermittent, nonintrusivemay consider masking if this worsens; no significant hearing loss on audiometric testing today    4. Vertigo  Patient with intermittent episodes of vertigo, for the past 6 months, lasting 30-60 seconds, provoked by positioning changes. Given positional testing and results of audiometric evaluation, likely BPPV as above      Return if symptoms worsen or fail to improve.       MD Trung Busch

## 2021-04-06 NOTE — PATIENT INSTRUCTIONS
Good Communication Strategies    Communication can be challenging for anyone, but can be especially difficult for those with some degree of hearing loss. While we may not be able to control every factor that may lead to difficulty with communication, there are Good Communication Strategies that we can all use in our day-to-day lives. Communication takes both parties working together for it to be successful. Tips as a Listener:   1. Control your environment. It is important to limit the amount of background noise in the room when possible. You should also consider having a good light source in the room to best see the other person. 2. Ask for clarification. Instead of saying \"What?\", you can use parts of what you heard to make a new question. For example, if you heard the word \"Thursday\" but not the rest of the week, you may ask \"What was that about Thursday? \" or \"What did you want to do Thursday? \". This shows the person talking that you are listening and will help them better explain what they are saying. 3. Be an advocate for yourself. If you are hearing but not understanding, tell the other person \"I can hear you, but I need you to slow down when you speak. \"  Or if someone is facing the other direction, say \"I cannot hear you when you are not looking at me when we talk. \"       Tips as a Talker:   - Sit or stand 3 to 6 feet away to maximize audibility         -- It is unrealistic to believe someone else will fully hear your message if you are speaking from across the room or in a different room in the house   - Stay at eye level to help with visual cues   - Make sure you have the persons attention before speaking   - Use facial expressions and gestures to accentuate your message   - Raise your voice slightly (do not scream)   - Speak slowly and distinctly   - Use short, simple sentences   - Rephrase your words if the person is having a hard time understanding you    - To avoid distortion, dont speak you care for yourself at home? · If your doctor recommends or prescribes medicine, take it exactly as directed. Call your doctor if you think you are having a problem with your medicine. · Do not drive while you feel dizzy. · Try to prevent falls. Steps you can take include:  ? Using nonskid mats, adding grab bars near the tub, and using night-lights. ? Clearing your home so that walkways are free of anything you might trip on.  ? Letting family and friends know that you have been feeling dizzy. This will help them know how to help you. When should you call for help? Call 911 anytime you think you may need emergency care. For example, call if:    · You passed out (lost consciousness). · You have dizziness along with symptoms of a heart attack. These may include:  ? Chest pain or pressure, or a strange feeling in the chest.  ? Sweating. ? Shortness of breath. ? Nausea or vomiting. ? Pain, pressure, or a strange feeling in the back, neck, jaw, or upper belly or in one or both shoulders or arms. ? Lightheadedness or sudden weakness. ? A fast or irregular heartbeat. · You have symptoms of a stroke. These may include:  ? Sudden numbness, tingling, weakness, or loss of movement in your face, arm, or leg, especially on only one side of your body. ? Sudden vision changes. ? Sudden trouble speaking. ? Sudden confusion or trouble understanding simple statements. ? Sudden problems with walking or balance. ? A sudden, severe headache that is different from past headaches. Call your doctor now or seek immediate medical care if:    · You feel dizzy and have a fever, headache, or ringing in your ears. · You have new or increased nausea and vomiting. · Your dizziness does not go away or comes back. Watch closely for changes in your health, and be sure to contact your doctor if:    · You do not get better as expected. Where can you learn more? Go to https://hector.[facility]. org and sign in to your optionsXpress account. Enter F966 in the Chromatik box to learn more about \"Dizziness: Care Instructions. \"     If you do not have an account, please click on the \"Sign Up Now\" link. Current as of: September 23, 2018  Content Version: 11.9  © 7567-0634 [employer]. Care instructions adapted under license by [facility] ([facility]). If you have questions about a medical condition or this instruction, always ask your healthcare professional. [address] any warranty or liability for your use of this information.

## 2021-04-06 NOTE — Clinical Note
Dr. Carlitos Cornejo,    Thank you for your referral for audiometric testing on this patient. Please see the scanned audiogram (under \"Media\" tab) and encounter note for details. If you have any questions, or if there is anything else you need, please let me know.       Anjali Rodriguez  Audiologist  ---  [address] [facility] - Audiology

## 2021-04-07 ENCOUNTER — OFFICE VISIT (OUTPATIENT)
Dept: FAMILY MEDICINE CLINIC | Age: 56
End: 2021-04-07
Payer: COMMERCIAL

## 2021-04-07 VITALS
BODY MASS INDEX: 45.83 KG/M2 | HEART RATE: 81 BPM | WEIGHT: 267 LBS | TEMPERATURE: 97.9 F | SYSTOLIC BLOOD PRESSURE: 162 MMHG | OXYGEN SATURATION: 97 % | DIASTOLIC BLOOD PRESSURE: 98 MMHG

## 2021-04-07 DIAGNOSIS — Z12.31 ENCOUNTER FOR SCREENING MAMMOGRAM FOR MALIGNANT NEOPLASM OF BREAST: ICD-10-CM

## 2021-04-07 DIAGNOSIS — Z12.11 COLON CANCER SCREENING: ICD-10-CM

## 2021-04-07 DIAGNOSIS — I10 HYPERTENSION, UNSPECIFIED TYPE: ICD-10-CM

## 2021-04-07 DIAGNOSIS — Z00.00 HEALTHCARE MAINTENANCE: ICD-10-CM

## 2021-04-07 DIAGNOSIS — Z76.89 ENCOUNTER TO ESTABLISH CARE: Primary | ICD-10-CM

## 2021-04-07 PROCEDURE — 99204 OFFICE O/P NEW MOD 45 MIN: CPT | Performed by: NURSE PRACTITIONER

## 2021-04-07 RX ORDER — BLOOD PRESSURE TEST KIT
1 KIT MISCELLANEOUS 2 TIMES DAILY
Qty: 1 KIT | Refills: 0 | Status: SHIPPED | OUTPATIENT
Start: 2021-04-07

## 2021-04-07 RX ORDER — HYDROCHLOROTHIAZIDE 25 MG/1
25 TABLET ORAL EVERY MORNING
Qty: 30 TABLET | Refills: 1 | Status: SHIPPED | OUTPATIENT
Start: 2021-04-07 | End: 2021-06-14 | Stop reason: SDUPTHER

## 2021-04-07 SDOH — ECONOMIC STABILITY: TRANSPORTATION INSECURITY
IN THE PAST 12 MONTHS, HAS LACK OF TRANSPORTATION KEPT YOU FROM MEETINGS, WORK, OR FROM GETTING THINGS NEEDED FOR DAILY LIVING?: NO

## 2021-04-07 SDOH — ECONOMIC STABILITY: TRANSPORTATION INSECURITY
IN THE PAST 12 MONTHS, HAS THE LACK OF TRANSPORTATION KEPT YOU FROM MEDICAL APPOINTMENTS OR FROM GETTING MEDICATIONS?: NO

## 2021-04-07 SDOH — ECONOMIC STABILITY: FOOD INSECURITY: WITHIN THE PAST 12 MONTHS, THE FOOD YOU BOUGHT JUST DIDN'T LAST AND YOU DIDN'T HAVE MONEY TO GET MORE.: NEVER TRUE

## 2021-04-07 SDOH — ECONOMIC STABILITY: INCOME INSECURITY: HOW HARD IS IT FOR YOU TO PAY FOR THE VERY BASICS LIKE FOOD, HOUSING, MEDICAL CARE, AND HEATING?: NOT HARD AT ALL

## 2021-04-07 ASSESSMENT — ENCOUNTER SYMPTOMS
EYES NEGATIVE: 1
GASTROINTESTINAL NEGATIVE: 1
RESPIRATORY NEGATIVE: 1

## 2021-04-07 ASSESSMENT — PATIENT HEALTH QUESTIONNAIRE - PHQ9
1. LITTLE INTEREST OR PLEASURE IN DOING THINGS: 0
SUM OF ALL RESPONSES TO PHQ QUESTIONS 1-9: 0
SUM OF ALL RESPONSES TO PHQ QUESTIONS 1-9: 0
2. FEELING DOWN, DEPRESSED OR HOPELESS: 0
SUM OF ALL RESPONSES TO PHQ QUESTIONS 1-9: 0
SUM OF ALL RESPONSES TO PHQ9 QUESTIONS 1 & 2: 0

## 2021-04-07 NOTE — PROGRESS NOTES
2021    Doris Verdin (:  1965) is a 63 y.o. female, here for a preventive medicine evaluation. Here to establish care as new pt. States that it has been a few years since she had a PCP. Pt is not fasting today. Works FT in IT. Single, no children. Her parents live with her. H/o vertigo. Saw the ENT yesterday. Denies family h/o breast or colon CA. She had a mammogram \"4 years ago\" states that it was normal.    She had a colonoscopy in [place]. Can not remember who she saw or when it was. States that she thinks she had a few polyps. Not UTD on routine gynecological exams. Post menopausal.  She is not sexually active. Denies tobacco, drug or alcohol use. H/o HTN, but not taking medication. Labs and EKG from 3/2021 rev'd. Patient Active Problem List   Diagnosis    Lateral meniscus tear    Localized osteoarthrosis, lower leg    Other viral warts    Essential tremor    Adiposity    Plantar fasciitis, right    Sprain of right ankle    Right ankle pain    Ankle pain    Plantar fasciitis, left       Review of Systems   Constitutional: Negative. HENT: Negative. Eyes: Negative. Respiratory: Negative. Cardiovascular: Negative. Gastrointestinal: Negative. Genitourinary: Negative. Skin: Negative. Neurological: Negative. Psychiatric/Behavioral: Negative. Prior to Visit Medications    Medication Sig Taking?  Authorizing Provider   hydroCHLOROthiazide (HYDRODIURIL) 25 MG tablet Take 1 tablet by mouth every morning Yes MARIAH Shakih CNP   Blood Pressure KIT 1 kit by Does not apply route 2 times daily Yes MARIAH Shaikh CNP   MECLIZINE HCL PO  Yes Historical Provider, MD   Acetaminophen (TYLENOL 8 HOUR ARTHRITIS PAIN PO)  Yes Historical Provider, MD   nystatin ([address]) 283489 UNIT/GM powder  Yes Historical Provider, MD   omeprazole (PRILOSEC) 10 MG delayed release capsule Take 20 mg by mouth daily Yes Historical Provider, MD   ondansetron (ZOFRAN) 4 MG tablet Take 1 tablet by mouth 3 times daily as needed for Nausea or Vomiting Yes Buddy Lucia MD   Multiple Vitamins-Minerals (MULTIVITAMIN PO) Take by mouth  Historical Provider, MD        Allergies   Allergen Reactions    Clarithromycin Hives and Rash    Adhesive Tape Rash    Sulfa Antibiotics Hives, Itching and Rash       Past Medical History:   Diagnosis Date    Asthma     Cholelithiasis     Dizziness     Migraine aura, persistent     Nausea & vomiting        Past Surgical History:   Procedure Laterality Date    CHOLECYSTECTOMY      CHOLECYSTECTOMY      ENDOMETRIAL ABLATION      KNEE ARTHROSCOPY Right 12/3/13    VAS partial menisectomy partial lateral meniscectomy, chondroplasty synovectomy    TONSILLECTOMY      WRIST SURGERY  1/13    rt wrist       Social History     Socioeconomic History    Marital status: Single     Spouse name: Not on file    Number of children: Not on file    Years of education: Not on file    Highest education level: Not on file   Occupational History    Not on file   Social Needs    Financial resource strain: Not hard at all   [employer] insecurity     Worry: Never true     Inability: Never true   [employer] needs     Medical: No     Non-medical: No   Tobacco Use    Smoking status: Never Smoker    Smokeless tobacco: Never Used   Substance and Sexual Activity    Alcohol use: No    Drug use: No    Sexual activity: Yes     Partners: Male   Lifestyle    Physical activity     Days per week: Not on file     Minutes per session: Not on file    Stress: Not on file   Relationships    Social connections     Talks on phone: Not on file     Gets together: Not on file     Attends [employer] service: Not on file     Active member of club or organization: Not on file     Attends meetings of clubs or organizations: Not on file     Relationship status: Not on file    Intimate partner violence     Fear of current or ex partner: Not on file     Emotionally abused: Not on file     Physically abused: Not on file     Forced sexual activity: Not on file   Other Topics Concern    Not on file   Social History Narrative    Not on file        No family history on file. ADVANCE DIRECTIVE: N, <no information>    Vitals:    04/07/21 1046 04/07/21 1111   BP: (!) 148/82 (!) 162/98   Site: Left Upper Arm Right Upper Arm   Position: Sitting Sitting   Cuff Size: Large Adult Large Adult   Pulse: 81    Temp: 97.9 °F (36.6 °C)    TempSrc: Temporal    SpO2: 97%    Weight: 267 lb (121.1 kg)      Estimated body mass index is 45.83 kg/m² as calculated from the following:    Height as of 4/6/21: 5' 4\" (1.626 m). Weight as of this encounter: 267 lb (121.1 kg). Physical Exam  Vitals signs reviewed. Constitutional:       Appearance: Normal appearance. She is well-developed. She is obese. She is not ill-appearing. HENT:      Head: Normocephalic. Eyes:      General: Lids are normal.         Right eye: No discharge. Left eye: No discharge. Neck:      Musculoskeletal: Full passive range of motion without pain and normal range of motion. Normal range of motion. No edema, crepitus or pain with movement. Thyroid: No thyroid mass, thyromegaly or thyroid tenderness. Vascular: Normal carotid pulses. No carotid bruit or JVD. Cardiovascular:      Rate and Rhythm: Normal rate and regular rhythm. Pulses: Normal pulses. Heart sounds: Normal heart sounds. No murmur. Pulmonary:      Effort: Pulmonary effort is normal.      Breath sounds: Normal breath sounds. Lymphadenopathy:      Cervical: No cervical adenopathy. Right cervical: No superficial cervical adenopathy. Left cervical: No superficial cervical adenopathy. Skin:     General: Skin is warm and dry. Capillary Refill: Capillary refill takes less than 2 seconds. Neurological:      General: No focal deficit present.       Mental Status: She is alert and oriented to person, place, and time. Psychiatric:         Mood and Affect: Mood normal.         Behavior: Behavior normal. Behavior is cooperative. Thought Content: Thought content normal.         Judgment: Judgment normal.         No flowsheet data found. Lab Results   Component Value Date    GLUCOSE 126 03/26/2021       The ASCVD Risk score (Katherine Hernandez, et al., 2013) failed to calculate for the following reasons:    Cannot find a previous HDL lab    Cannot find a previous total cholesterol lab      There is no immunization history on file for this patient. Health Maintenance   Topic Date Due    Hepatitis C screen  Never done    HIV screen  Never done    COVID-19 Vaccine (1) Never done    Cervical cancer screen  Never done    Lipid screen  Never done    Diabetes screen  Never done    Breast cancer screen  Never done    Shingles Vaccine (1 of 2) Never done    Colon cancer screen colonoscopy  Never done    Flu vaccine (Season Ended) 09/01/2021    DTaP/Tdap/Td vaccine (2 - Td) 03/23/2025    Hepatitis A vaccine  Aged Out    Hepatitis B vaccine  Aged Out    Hib vaccine  Aged Out    Meningococcal (ACWY) vaccine  Aged Out    Pneumococcal 0-64 years Vaccine  Aged Out       ASSESSMENT/PLAN:  1. Encounter to establish care    2. Healthcare maintenance  Encouraged healthy diet, weight loss and exercise. Recommended routine dental and vision exams. Will return for fasting labs. Due for routine gynecological exam, referral placed. -     CBC Auto Differential; Future  -     Comprehensive Metabolic Panel; Future  -     Lipid Panel; Future  -     T4, Free; Future  -     TSH without Reflex; Future  -     Hemoglobin A1C  -     Hepatitis C Antibody  -     HIV Screen  -     [facility] [place], DO, Obstetrics, [place]    3. Encounter for screening mammogram for malignant neoplasm of breast  -     EH DIGITAL SCREEN W OR WO CAD BILATERAL; Future    4.  Colon cancer screening  -     [address], Brooklynn Wood MD, Gastroenterology, [facility]    5. Hypertension, unspecified type  H/o HTN, not on medication. Labs and EKG from 3/2021 rev'd. Will start the pt on HCTZ. Advised pt to monitor and record BP readings BID. Will f/u in 4 weeks or sooner if needed. -     hydroCHLOROthiazide (HYDRODIURIL) 25 MG tablet; Take 1 tablet by mouth every morning, Disp-30 tablet, R-1Normal  -     Blood Pressure KIT; 2 TIMES DAILY Starting Wed 4/7/2021, Disp-1 kit, R-0, Normal      Return in about 4 weeks (around 5/5/2021) for Hypertension. An electronic signature was used to authenticate this note.     --MARIAH Santos - CNP on 4/7/2021 at 11:20 AM

## 2021-04-07 NOTE — PATIENT INSTRUCTIONS
Patient Education        Well Visit, Women 48 to 72: Care Instructions  Overview     Well visits can help you stay healthy. Your doctor has checked your overall health and may have suggested ways to take good care of yourself. Your doctor also may have recommended tests. At home, you can help prevent illness with healthy eating, regular exercise, and other steps. Follow-up care is a key part of your treatment and safety. Be sure to make and go to all appointments, and call your doctor if you are having problems. It's also a good idea to know your test results and keep a list of the medicines you take. How can you care for yourself at home? · Get screening tests that you and your doctor decide on. Screening helps find diseases before any symptoms appear. · Eat healthy foods. Choose fruits, vegetables, whole grains, protein, and low-fat dairy foods. Limit fat, especially saturated fat. Reduce salt in your diet. · Limit alcohol. Have no more than 1 drink a day or 7 drinks a week. · Get at least 30 minutes of exercise on most days of the week. Walking is a good choice. You also may want to do other activities, such as running, swimming, cycling, or playing tennis or team sports. · Reach and stay at a healthy weight. This will lower your risk for many problems, such as obesity, diabetes, heart disease, and high blood pressure. · Do not smoke. Smoking can make health problems worse. If you need help quitting, talk to your doctor about stop-smoking programs and medicines. These can increase your chances of quitting for good. · Care for your mental health. It is easy to get weighed down by worry and stress. Learn strategies to manage stress, like deep breathing and mindfulness, and stay connected with your family and community. If you find you often feel sad or hopeless, talk with your doctor. Treatment can help.   · Talk to your doctor about whether you have any risk factors for sexually transmitted infections (STIs). You can help prevent STIs if you wait to have sex with a new partner (or partners) until you've each been tested for STIs. It also helps if you use condoms (male or female condoms) and if you limit your sex partners to one person who only has sex with you. Vaccines are available for some STIs. · If you think you may have a problem with alcohol or drug use, talk to your doctor. This includes prescription medicines (such as amphetamines and opioids) and illegal drugs (such as cocaine and methamphetamine). Your doctor can help you figure out what type of treatment is best for you. · Protect your skin from too much sun. When you're outdoors from 10 a.m. to 4 p.m., stay in the shade or cover up with clothing and a hat with a wide brim. Wear sunglasses that block UV rays. Even when it's cloudy, put broad-spectrum sunscreen (SPF 30 or higher) on any exposed skin. · See a dentist one or two times a year for checkups and to have your teeth cleaned. · Wear a seat belt in the car. When should you call for help? Watch closely for changes in your health, and be sure to contact your doctor if you have any problems or symptoms that concern you. Where can you learn more? Go to https://EBOOKAPLACE.healthibox Holding Limitedpartners. org and sign in to your [employer] account. Enter P909 in the [facility] box to learn more about \"Well Visit, Women 50 to 72: Care Instructions. \"     If you do not have an account, please click on the \"Sign Up Now\" link. Current as of: May 27, 2020               Content Version: 12.8  © 6585-1666 Healthwise, [employer]. Care instructions adapted under license by [facility] ([facility] If you have questions about a medical condition or this instruction, always ask your healthcare professional. [place] [address] any warranty or liability for your use of this information. Patient Education        Home Blood Pressure Test: About This Test  What is it?      A home blood pressure test allows you to keep track of your blood pressure at home. Blood pressure is a measure of the force of blood against the walls of your arteries. Blood pressure readings include two numbers, such as 130/80 (say \"130 over 80\"). The first number is the systolic pressure. The second number is the diastolic pressure. Why is this test done? You may do this test at home to:  · Find out if you have high blood pressure. · Track your blood pressure if you have high blood pressure. · Track how well medicine is working to reduce high blood pressure. · Check how lifestyle changes, such as weight loss and exercise, are affecting blood pressure. How do you prepare for the test?  For at least 30 minutes before you take your blood pressure, don't exercise, drink caffeine, or smoke. Empty your bladder before the test. Sit quietly with your back straight and both feet on the floor for at least 5 minutes. This helps you take your blood pressure while you feel comfortable and relaxed. How is the test done? · If your doctor recommends it, take your blood pressure twice a day. Take it in the morning and evening. · Sit with your arm slightly bent and resting on a table so that your upper arm is at the same level as your heart. · Use the same arm each time you take your blood pressure. · Place the blood pressure cuff on the bare skin of your upper arm. You may have to roll up your sleeve, remove your arm from the sleeve, or take your shirt off. · Wrap the blood pressure cuff around your upper arm so that the lower edge of the cuff is about 1 inch above the bend of your elbow. · Do not move, talk, or text while you take your blood pressure. Follow the instructions that came with your blood pressure monitor. They might be different from the following. · Press the on/off button on the automatic monitor. Then you may need to wait until the screen says the monitor is ready. · Press the start button.  The cuff will inflate and deflate by itself. · Your blood pressure numbers will appear on the screen. · Wait one minute and take your blood pressure again. · If your monitor does not automatically save your numbers, write them in your log book, along with the date and time. Follow-up care is a key part of your treatment and safety. Be sure to make and go to all appointments, and call your doctor if you are having problems. It's also a good idea to keep a list of the medicines you take. Where can you learn more? Go to https://Bug Labspepiceweb."[employer]". org and sign in to your [employer] account. Enter C427 in the [employer] box to learn more about \"Home Blood Pressure Test: About This Test.\"     If you do not have an account, please click on the \"Sign Up Now\" link. Current as of: August 31, 2020               Content Version: 12.8  © 2006-2021 [employer]base. Care instructions adapted under license by [facility] ([facility]). If you have questions about a medical condition or this instruction, always ask your healthcare professional. [address] any warranty or liability for your use of this information. Patient Education        High Blood Pressure: Care Instructions  Overview     It's normal for blood pressure to go up and down throughout the day. But if it stays up, you have high blood pressure. Another name for high blood pressure is hypertension. Despite what a lot of people think, high blood pressure usually doesn't cause headaches or make you feel dizzy or lightheaded. It usually has no symptoms. But it does increase your risk of stroke, heart attack, and other problems. You and your doctor will talk about your risks of these problems based on your blood pressure. Your doctor will give you a goal for your blood pressure. Your goal will be based on your health and your age. Lifestyle changes, such as eating healthy and being active, are always important to help lower blood pressure. You might also take medicine to reach your blood pressure goal.  Follow-up care is a key part of your treatment and safety. Be sure to make and go to all appointments, and call your doctor if you are having problems. It's also a good idea to know your test results and keep a list of the medicines you take. How can you care for yourself at home? Medical treatment  · If you stop taking your medicine, your blood pressure will go back up. You may take one or more types of medicine to lower your blood pressure. Be safe with medicines. Take your medicine exactly as prescribed. Call your doctor if you think you are having a problem with your medicine. · Talk to your doctor before you start taking aspirin every day. Aspirin can help certain people lower their risk of a heart attack or stroke. But taking aspirin isn't right for everyone, because it can cause serious bleeding. · See your doctor regularly. You may need to see the doctor more often at first or until your blood pressure comes down. · If you are taking blood pressure medicine, talk to your doctor before you take decongestants or anti-inflammatory medicine, such as ibuprofen. Some of these medicines can raise blood pressure. · Learn how to check your blood pressure at home. Lifestyle changes  · Stay at a healthy weight. This is especially important if you put on weight around the waist. Losing even 10 pounds can help you lower your blood pressure. · If your doctor recommends it, get more exercise. Walking is a good choice. Bit by bit, increase the amount you walk every day. Try for at least 30 minutes on most days of the week. You also may want to swim, bike, or do other activities. · Avoid or limit alcohol. Talk to your doctor about whether you can drink any alcohol. · Try to limit how much sodium you eat to less than 2,300 milligrams (mg) a day. Your doctor may ask you to try to eat less than 1,500 mg a day.   · Eat plenty of fruits (such as bananas and Blurry vision. Watch closely for changes in your health, and be sure to contact your doctor if:    · Your blood pressure measures higher than your doctor recommends at least 2 times. That means the top number is higher or the bottom number is higher, or both.     · You think you may be having side effects from your blood pressure medicine. Where can you learn more? Go to https://Ambient Devicespepiceweb.[employer]. org and sign in to your [employer] account. Enter F510 in the [employer] box to learn more about \"High Blood Pressure: Care Instructions. \"     If you do not have an account, please click on the \"Sign Up Now\" link. Current as of: August 31, 2020               Content Version: 12.8  © 2006-2021 Healthwise, Incorporated. Care instructions adapted under license by [facility] ([facility]). If you have questions about a medical condition or this instruction, always ask your healthcare professional. [address] any warranty or liability for your use of this information.

## 2021-04-09 ENCOUNTER — TELEPHONE (OUTPATIENT)
Dept: ENT CLINIC | Age: 56
End: 2021-04-09

## 2021-04-09 NOTE — TELEPHONE ENCOUNTER
Patient called to let Dr. Korin Coon know that the physical therapy is unable to get her in within the next two-three weeks. Patient states that is is having a lot of trouble sleeping and isn't sure what to do about her BPPV.     Patient call back number is: [phone]

## 2021-04-12 ENCOUNTER — HOSPITAL ENCOUNTER (OUTPATIENT)
Dept: PHYSICAL THERAPY | Age: 56
Setting detail: THERAPIES SERIES
Discharge: HOME | End: 2021-04-12
Payer: COMMERCIAL

## 2021-04-12 PROCEDURE — 97112 NEUROMUSCULAR REEDUCATION: CPT

## 2021-04-12 PROCEDURE — 97161 PT EVAL LOW COMPLEX 20 MIN: CPT

## 2021-04-12 NOTE — PROGRESS NOTES
The following patient has been evaluated for physical therapy services. In order for therapy to continue treatment, physician review of the treatment plan is needed. Please review the attached evaluation and/or summary of the patient's plan of care, and verify that you agree therapy should continue by signing below and sending it back to our office. Thank you for this referral.    Physician signature_______________________ Date________________    Fax to:   [facility] [phone]               PHYSICAL THERAPY VESTIBULAR EVALUATION  AND Treatment Note    Evaluation Date: 4/12/2021       Patient Name: Saw Rai     YOB: 1965      Medical Diagnosis/ ICD 10:  BPPV H81.13  Treatment Diagnosis:  Vertigo    Onset Date: 3/26/21  Referral Date: 4/6/21    Referring Physician:  Shirley Yeung     Insurance/Certification Information/Allowed visits:  [employer] (deductible)       Restrictions/Precautions:  Latex Allergy:   []Yes   [x]No      Preferred Language for HealthCare:   [x]English   []Other:         SUBJECTIVE FINDINGS      History of Present Illness:      Pt presents with C/o severe dizziness that causes vomitting. She went to a Chiro, which did not help. She went to the ER on March 26. They referred her to ENT. She was tested in his office. States that the test was torture. She was very dizzy in his office and did vomit. She has been sleeping in her chair, but did attempt to lay supine this weekend which made her extremely dizzy and it was horrible. Since that time, however, she has been feeling a little bit better. Today has been a better day. Pt reports symptoms began on and off for 6 months. Went on a little road trip and woke up in the middle of the night with dizziness. Could barely stand up to go to the restroom. Has been sleeping in a chair since March. That is killing her back. Cannot walk well because of the back pain. []No  Symptoms described as:  [x]Room spinning  []Loss of balance  []Uneasiness  []Lightheadedness   How long do these spells last?      [] Seconds     [x] Minutes      [] Hours    How often do spells occur? [x] Daily      [] Weekly      [] Monthly  Vertigo is:         [x]Spontaneous    [x]Induced by motion     [x]Induced by position changes    Pt experiences disequilibrium? [x]Yes     []No  Disequilibrium is:        []Spontaneous    [x]Induced by motion     []Induced by position changes            [x]Occurs when lying down     [x]Occurs when sitting up  []Occurs when standing still  []Occurs when walking     []Complex visual environments         Associated hearing/ ear symptoms:     [x]Yes     []No  []Sudden hearing loss    []Right   []Left   []Both     []Tinnitus       []Right   []Left   [] Both       [x]Pressure/fullness   []Right   []Left   [x]Both  []Pain      []Right   []Left   []Both     Does pt have chronic hearing loss? []Yes     [x]No    Does pt wear hearing aids? []Yes     [x]No    Any recent illness or infections? []Yes     [x]No    Any recent accidents or head trauma? []Yes     [x]No    Any history of migraines or headaches? [x]Yes  Has not had a migraine in several years. Report of Falls or near Falls:    Any falls to the ground? []Yes     [x]No  Any near falls? []Yes     [x]No  Does pt complain of stumble, stagger, or side step while walking? [x]Yes     []No  Does pt complain of drift to one side while walking? []Yes     [x]No    Visual Field Limitations :    Does pt wear glasses/contacts?      [x]Yes     []No  []Reading     []Distance     [x]Bifocals /Trifocals     []Prisms   Recent Change in Vision associated with symptoms:  []Yes :    [x]No        OBJECTIVE FINDINGS    Blood Pressure (if possible orthostatic HTN):  [x]Not tested  Supine:   Seated:  Standing:     Cervical AROM:    []WNL     [x]WFL     []Impaired     []Painful    Vertebral []Impaired    Balance  Tinetti Total Score:  NT       Gait Testing:     Level of Assistance needed:     []Ind   [x]Mod I with cane []SBA/Sup   []CGA   []MIN A  []MOD A  []MAX A   []Dependent/Total Assist     Assistive Device Used:    []None  [x]Straight Cane  []Quad Cane []Crutch(es) []SW  []RW      Strength/ROM Testing      [x]All WFL rating 5/5 except as marked below: Movement Left Right Movement Left Right Comments   Shoulder Flexion   Hip Flexion      Shoulder Extension   Hip Extension      Shoulder Abduction   Hip Abduction      Shoulder IR   Hip Adduction      Shoulder ER   Hip Internal Rotation      Elbow Flexion    Hip External Rotation      Elbow Extension   Knee Flexion      Supination   Knee Extension      Pronation   Ankle Dorsiflexion      Wrist Flexion   Ankle Plantarflexion      Wrist Extension   Ankle Inversion      Hand   Ankle Eversion          Functional Outcome Measure: DHI 56     ASSESSMENT  : Pt is 63 Y. O   female, presenting with c/o severe room spinning vertigo lasting several weeks and provoking vomitting. Assessment reveals positional testing that is NEGATIVE this date for  canalithiasis BPPV. Positive for vestibular hypofunction, motion sensitivity, as well as deficits in balance, gaze stabilization and ambulation. Presentation is suspicious for self correcting BPPV, considering subjective reports of dizziness that was provoked by laying down and rolling in bed this past weekend, but that has since improved. Pt will benefit from cont PT to address these deficits and promote return to highest level of functional independence.        Problems  []Positive for BPPV   [x]Positive for vestibular hypofunction/decreased gaze stability  []Positive findings for central disorder  [x]Positive for motion sensitivity  [x]Abnormality of gait  [x]Decreased balance  []Decreased ROM/Strength  [x]Decreased functional status   []Musculoskeletal Pain/Cervicogenic dizziness        Rehabilitation Potential:  Good for goals listed below. Strengths for achieving goals include:   [x]Pt motivated   []PLOF   []Family support   Limitations for achieving goals include:   []None   [x]Severity of condition   []Cognitive   []Comorbidities  []Other:         Prognosis: [x]Good   []Fair   []Poor    GOALS      Short Term Goals:   2  weeks MET Not Met Long Term Goals:  4  weeks MET Not Met   1). Reassess for BPPV [] [] 1). Negative positional testing in all planes [] []   2). Decreased subjective c/o dizziness with DHI of 25 or less [] [] 2). No further c/o dizziness, with DHI of 10 or less [] []   3). Establish HEP [] [] 3). Indep with HEP [] []   4). Progress to week 2 of vestibular protocol as needed.   [] [] 4). [] []   5). [] [] 5). [] []   6). [] [] 6). [] []       PLAN OF CARE  To see patient  1-2 x/week for  4-6 weeks for the following treatment interventions:    []Canalith Repositioning Procedures for BPPV  [x]Neuromuscular Re-education:  []Gaze Stability Ex  []Balance Ex   []Habituation Ex  []Therapeutic Exercise   []Gait Training   []Manual Therapy  []Therapeutic Activity           DAILY TREATMENT NOTE FOR DAY OF EVALUATION:    Treatment Performed this visit :   45 minutes     Pt education provided regarding anatomy and physiology associated with dx, etiology of Symptoms and plan of care. All questions answered to pt satisfaction.  Pt education for [employer] Habituation Exercise, to stabilize motion sensitivity associated with Vestibular hypofunction    Pt education for VOR GS exercise, Vestibular protocol week 1    Pt response to Tx: Tolerated well with no reproduction of dizziness, no c/o nausea    Plan for Next Visit:     Pt has planned surgery Thursday 4/15/21, for knee repair. Pt has planned vacation to [place] in May.      Will call to schedule follow up visit, if symptoms persist.    This note will serve as Eval and DC Summary if pt does not return for treatment. Thank you for the referral of this patient.       Timed Code Treatment Minutes:   45  minutes   Total Treatment Time: 60   minutes    Plan of care sent to provider:      [x]Faxed  []Co-signature    (attempts: 1[x] 2 []3[])             Korin Smoker PT, MPT, OMT-c [id number]

## 2021-04-13 ENCOUNTER — OFFICE VISIT (OUTPATIENT)
Dept: FAMILY MEDICINE CLINIC | Age: 56
End: 2021-04-13
Payer: COMMERCIAL

## 2021-04-13 VITALS
BODY MASS INDEX: 45.58 KG/M2 | OXYGEN SATURATION: 97 % | DIASTOLIC BLOOD PRESSURE: 76 MMHG | RESPIRATION RATE: 16 BRPM | SYSTOLIC BLOOD PRESSURE: 122 MMHG | HEIGHT: 64 IN | HEART RATE: 97 BPM | WEIGHT: 267 LBS

## 2021-04-13 DIAGNOSIS — S83.282D TEAR OF LATERAL MENISCUS OF LEFT KNEE, CURRENT, UNSPECIFIED TEAR TYPE, SUBSEQUENT ENCOUNTER: ICD-10-CM

## 2021-04-13 DIAGNOSIS — R42 VERTIGO: Primary | ICD-10-CM

## 2021-04-13 DIAGNOSIS — Z01.818 PRE-OP EXAMINATION: Primary | ICD-10-CM

## 2021-04-13 LAB
BILIRUBIN URINE: NEGATIVE
BLOOD, URINE: NEGATIVE
CLARITY: CLEAR
COLOR: YELLOW
GLUCOSE URINE: NEGATIVE MG/DL
KETONES, URINE: NEGATIVE MG/DL
LEUKOCYTE ESTERASE, URINE: NEGATIVE
MICROSCOPIC EXAMINATION: NORMAL
NITRITE, URINE: NEGATIVE
PH UA: 6 (ref 5–8)
PROTEIN UA: NEGATIVE MG/DL
SPECIFIC GRAVITY UA: 1.02 (ref 1–1.03)
URINE TYPE: NORMAL
UROBILINOGEN, URINE: 0.2 E.U./DL

## 2021-04-13 PROCEDURE — 81003 URINALYSIS AUTO W/O SCOPE: CPT | Performed by: NURSE PRACTITIONER

## 2021-04-13 PROCEDURE — 99214 OFFICE O/P EST MOD 30 MIN: CPT | Performed by: NURSE PRACTITIONER

## 2021-04-13 RX ORDER — ONDANSETRON 4 MG/1
4 TABLET, FILM COATED ORAL EVERY 8 HOURS PRN
Qty: 42 TABLET | Refills: 1 | Status: SHIPPED | OUTPATIENT
Start: 2021-04-13 | End: 2021-04-27

## 2021-04-13 NOTE — PROGRESS NOTES
Preoperative Evaluation    Subjective:   Ashly Engel is a 63 y.o. y.o.  female who presents to the office today for a preoperative consultation. Surgeon: Dr. Russell Foot    Location: [address] [place] [address]  Performing:  Left knee arthroscopy with partial medial menisectomy, synovectomy, chondral debridement and plica excision. Date: April 15. Planned anesthesia is General.   The patient has the following known anesthesia issues: N/V  Patient has a bleeding risk of : no recent abnormal bleeding   Patient does not have objection to receiving blood products if needed. Steroid injections bilateral knees    Review of Systems   Pertinent items are noted in HPI. Denies fevers, chills, diaphoresis, CP, SOB, dysphagia, abd pain, urinary complaints, new edema, rashes, cyanosis    Past Medical History:   Diagnosis Date    Asthma     Cholelithiasis     Dizziness     Migraine aura, persistent     Nausea & vomiting        Past Surgical History:   Procedure Laterality Date    CHOLECYSTECTOMY      CHOLECYSTECTOMY      ENDOMETRIAL ABLATION      KNEE ARTHROSCOPY Right 12/3/13    VAS partial menisectomy partial lateral meniscectomy, chondroplasty synovectomy    TONSILLECTOMY      WRIST SURGERY  1/13    rt wrist       Social History     Tobacco History     Smoking Status  Never Smoker    Smokeless Tobacco Use  Never Used          Alcohol History     Alcohol Use Status  No          Drug Use     Drug Use Status  No          Sexual Activity     Sexually Active  Yes Partners  Male                No family history on file.     Current Outpatient Medications   Medication Sig Dispense Refill    hydroCHLOROthiazide (HYDRODIURIL) 25 MG tablet Take 1 tablet by mouth every morning 30 tablet 1    Blood Pressure KIT 1 kit by Does not apply route 2 times daily 1 kit 0    MECLIZINE HCL PO       Acetaminophen (TYLENOL 8 HOUR ARTHRITIS PAIN PO)       nystatin (NYAMYC) 675958 UNIT/GM powder       omeprazole (PRILOSEC) 10 MG delayed release capsule Take 20 mg by mouth daily      ondansetron (ZOFRAN) 4 MG tablet Take 1 tablet by mouth 3 times daily as needed for Nausea or Vomiting 30 tablet 0    Multiple Vitamins-Minerals (MULTIVITAMIN PO) Take by mouth       No current facility-administered medications for this visit. Objective:   Vitals:    04/13/21 0941   BP: 122/76   Pulse:    Resp:    SpO2:        Physical Exam   /76 (Site: Right Upper Arm, Position: Sitting, Cuff Size: Large Adult)   Pulse 97   Resp 16   Ht 5' 4\" (1.626 m)   Wt 267 lb (121.1 kg)   LMP 11/02/2015   SpO2 97%   BMI 45.83 kg/m²   General appearance: alert, appears stated age, and cooperative  Throat: lips, mucosa, and tongue normal; Full upper and lower dentures   Lungs: clear to auscultation bilaterally  Heart: regular rate and rhythm, S1, S2 normal, no murmur, click, rub or gallop  Extremities: extremities normal, atraumatic, no cyanosis or edema  Pulses: 2+ and symmetric  Skin: Skin color, texture, turgor normal. No rashes or lesions  Neurologic: Grossly normal     Predictors of intubation difficulty:   Morbid obesity? yes - 45.8   Anatomically abnormal facies? no   Short, thick neck?  yes  Neck range of motion: normal   Mallampati score: II (soft palate, uvula, fauces visible)   Dentition:  Upper and lower full dentures    Cardiographics   ECG: normal sinus rhythm, no blocks or conduction defects, no ischemic changes done 3/26/2021    Lab Review   Admission on 03/26/2021, Discharged on 03/26/2021   Component Date Value    WBC 03/26/2021 9.8     RBC 03/26/2021 4.06     Hemoglobin 03/26/2021 12.9     Hematocrit 03/26/2021 37.7     MCV 03/26/2021 93.0     MCH 03/26/2021 31.9     MCHC 03/26/2021 34.2     RDW 03/26/2021 13.4     Platelets 09/63/2020 290     MPV 03/26/2021 8.8     Neutrophils % 03/26/2021 62.7     Lymphocytes % 03/26/2021 25.9     Monocytes % 03/26/2021 6.3     Eosinophils % 03/26/2021 3.7     Basophils % 03/26/2021 1.4     Neutrophils Absolute 03/26/2021 6.1     Lymphocytes Absolute 03/26/2021 2.5     Monocytes Absolute 03/26/2021 0.6     Eosinophils Absolute 03/26/2021 0.4     Basophils Absolute 03/26/2021 0.1     Sodium 03/26/2021 137     Potassium reflex Magnesi* 03/26/2021 4.1     Chloride 03/26/2021 100     CO2 03/26/2021 27     Anion Gap 03/26/2021 10     Glucose 03/26/2021 126*    BUN 03/26/2021 13     CREATININE 03/26/2021 0.7     GFR Non- 03/26/2021 >60     GFR  03/26/2021 >60     Calcium 03/26/2021 9.9     Total Protein 03/26/2021 7.5     Albumin 03/26/2021 4.4     Albumin/Globulin Ratio 03/26/2021 1.4     Total Bilirubin 03/26/2021 0.3     Alkaline Phosphatase 03/26/2021 105     ALT 03/26/2021 37     AST 03/26/2021 33     Globulin 03/26/2021 3.1     Troponin 03/26/2021 <0.01     Pro-BNP 03/26/2021 43     Ventricular Rate 03/26/2021 78     Atrial Rate 03/26/2021 78     P-R Interval 03/26/2021 154     QRS Duration 03/26/2021 78     Q-T Interval 03/26/2021 390     QTc Calculation (Bazett) 03/26/2021 444     P Axis 03/26/2021 36     R Axis 03/26/2021 19     T Axis 03/26/2021 33     Diagnosis 03/26/2021 Normal sinus rhythmNormal ECGWhen compared with ECG of 09-JUN-2009 00:52,No significant change was foundConfirmed by Karie Zafar MD, Lucio Penaloza ([id number]) on 3/27/2021 5:44:06 PM     Color, UA 03/26/2021 Yellow     Clarity, UA 03/26/2021 Clear     Glucose, Ur 03/26/2021 Negative     Bilirubin Urine 03/26/2021 Negative     Ketones, Urine 03/26/2021 Negative     Specific Gravity, UA 03/26/2021 1.020     Blood, Urine 03/26/2021 Negative     pH, UA 03/26/2021 5.0     Protein, UA 03/26/2021 Negative     Urobilinogen, Urine 03/26/2021 0.2     Nitrite, Urine 03/26/2021 Negative     Leukocyte Esterase, Urine 03/26/2021 Negative     Microscopic Examination 03/26/2021 Not Indicated     Urine Type 03/26/2021 NotGiven     Urine Reflex to Culture 03/26/2021 Not Indicated         Assessment:   63 y.o. female with planned surgery as above. - Known risk factors for perioperative complications: None   - Difficulty with intubation is not anticipated. - Current medications which may produce withdrawal symptoms if withheld perioperatively: none    Plan:   1. Preoperative workup as follows urinalysis (urinary tract instrumentation planned), urine culture. CBC, BMP and EKG already done 3/26/2021.  2. Change in medication regimen before surgery: none, continue med regimen including morning of surgery, w/sip of water   Pt instructed to avoid NSAIDs, ASA, MV, Vitamin E, Fish oil 1 week prior to surgery to decrease bleeding risk. 3. Prophylaxis for cardiac events with perioperative beta-blockers: not indicated   4. Invasive hemodynamic monitoring perioperatively: not indicated   5. Deep vein thrombosis prophylaxis postoperatively:regimen to be chosen by surgical team   6. Other measures: none      1. Tear of lateral meniscus of left knee, current, unspecified tear type, subsequent encounter      2. Pre-op examination    - URINALYSIS  - Culture, Urine      While assessing care for this patient, I have reviewed all pertinent lab work/imaging/ specialist notes and care in reference to those problems addressed above in detail. Appropriate medical decision making was based on this.        Pt is at an acceptable risk for planned procedure    Please call with any questions    Nikky Willams CNP

## 2021-04-14 ENCOUNTER — HOSPITAL ENCOUNTER (OUTPATIENT)
Dept: PHYSICAL THERAPY | Age: 56
Setting detail: THERAPIES SERIES
Discharge: HOME | End: 2021-04-14
Payer: COMMERCIAL

## 2021-04-14 LAB — URINE CULTURE, ROUTINE: NORMAL

## 2021-04-14 PROCEDURE — 97112 NEUROMUSCULAR REEDUCATION: CPT

## 2021-04-14 PROCEDURE — 95992 CANALITH REPOSITIONING PROC: CPT

## 2021-04-14 NOTE — FLOWSHEET NOTE
Outpatient Physical Therapy     [x] Daily Treatment Note   [] Progress Note   [] Discharge Note      Date:  4/14/2021    Patient Name:  Sanju Wise         YOB: 1965    Medical Diagnosis/ ICD 10:  BPPV H81.13  Treatment Diagnosis:  Vertigo     Onset Date: 3/26/21  Referral Date: 4/6/21     Referring Physician:  Burton Alvarez     Insurance/Certification Information/Allowed visits:  [employer] (deductible)       Restrictions/Precautions:  Latex Allergy:   []? Yes   [x]? No       Preferred Language for HealthCare:          [x]? English   []? Other:        Plan of care sent to provider:      [x]Faxed  []Co-signature    (attempts: 1[x] 2 []3[])         Plan of care signed:      []Yes date:            [x]No      Progress Note covers period from (if applicable):    [x]NA    []From          To           Next Progress Note due:   5/12/21    Visit# / total visits:  2/12    Plan for Next Session:     Reassess for BPPV     Subjective:  Pt states that she is still having occasional dizziness. She did tolerate laying supine and sleeping in her bed all night, but was dizzy with rolling to her right side. No issues with rolling to the Left. She is scheduled for knee surgery tomorrow, so wanted to return for another tx prior to that. She has not taken meclizine since her last visit, and she has not taken any medicine for nausea. Pain level: 0/10  AT EVAL:       Objective:       Exercises:    Exercises in bold performed in department today. Items not bolded are carried forward from prior visits for continuity of the record. Exercise/Equipment Resistance/Repetitions HEP Other comments       []     Pedrito Mathis Habituation Ex  Reviewed  [x] Pt instructed to hold HEP until Monday, as she recovers from Knee surgery.   Resume Habituation ex if symptoms persist.     VOR GS ex, [employer]  Reviewed [x]        []        []        []        []        []        []          []         []        [] []        []        []        []        []        []      Therapeutic Exercise/Home Exercise Program:   0 minutes    Therapeutic Activity:  0 minutes     Gait: 0 minutes    Neuromuscular Re-Education:  30 minutes  Reassess for BPPV.   (+) Torsional nystagmus with RIGHT [facility] test, this date. Review of HEP as provided      Canalith Repositioning Procedure:  10 minutes  CRT performed to RIGHT x 3. Decreased subjective c/o dizziness with final procedure    Manual Therapy:  0 minutes    Modalities: 0 minutes    Functional Outcome Measure:    DHI 56       Assessment/Treatment/Activity Tolerance:    Patients response to treatment:   [x]Patient able to complete treatment  []Patient limited by fatigue   []Patient limited by pain   []Patient limited by other medical complications   []Other:     GOALS      Short Term Goals:   2  weeks MET Not Met Long Term Goals:  4  weeks MET Not Met   1). Reassess for BPPV []?  []?  1). Negative positional testing in all planes []?  []?    2). Decreased subjective c/o dizziness with DHI of 25 or less []?  []?  2). No further c/o dizziness, with DHI of 10 or less []?  []?    3). Establish HEP []?  []?  3). Indep with HEP []?  []?    4). Progress to week 2 of vestibular protocol as needed. []?  []?  4). []?  []?    5). []?  []?  5). []?  []?    6).               Prognosis: [x]Good   []Fair   []Poor    Patient Requires Follow-up:  [x]Yes  []No    Plan: []Plan of care initiated     [x]Continue per plan of care    [] Alter current plan (see comments)    []Hold pending MD visit []Discharge    Timed Code Treatment Minutes:  30    Total Treatment Minutes:  40          Electronically signed by:  Marilynn Ambriz, PT, MPT, OMT-c [id number]

## 2021-05-11 LAB
ANION GAP SERPL CALCULATED.3IONS-SCNC: 12 MMOL/L (ref 3–16)
BUN BLDV-MCNC: 12 MG/DL (ref 7–20)
CALCIUM SERPL-MCNC: 9.4 MG/DL (ref 8.3–10.6)
CHLORIDE BLD-SCNC: 100 MMOL/L (ref 99–110)
CO2: 26 MMOL/L (ref 21–32)
CREAT SERPL-MCNC: 0.8 MG/DL (ref 0.6–1.1)
GFR AFRICAN AMERICAN: >60
GFR NON-AFRICAN AMERICAN: >60
GLUCOSE BLD-MCNC: 136 MG/DL (ref 70–99)
HCT VFR BLD CALC: 39.7 % (ref 36–48)
HEMOGLOBIN: 13.4 G/DL (ref 12–16)
MCH RBC QN AUTO: 32.2 PG (ref 26–34)
MCHC RBC AUTO-ENTMCNC: 33.8 G/DL (ref 31–36)
MCV RBC AUTO: 95.2 FL (ref 80–100)
PDW BLD-RTO: 13.9 % (ref 12.4–15.4)
PLATELET # BLD: 290 K/UL (ref 135–450)
PMV BLD AUTO: 9.4 FL (ref 5–10.5)
POTASSIUM SERPL-SCNC: 3.7 MMOL/L (ref 3.5–5.1)
RBC # BLD: 4.17 M/UL (ref 4–5.2)
SODIUM BLD-SCNC: 138 MMOL/L (ref 136–145)
WBC # BLD: 7.2 K/UL (ref 4–11)

## 2021-05-28 DIAGNOSIS — Z00.00 HEALTHCARE MAINTENANCE: Primary | ICD-10-CM

## 2021-05-28 DIAGNOSIS — Z00.00 HEALTHCARE MAINTENANCE: ICD-10-CM

## 2021-05-28 LAB
A/G RATIO: 1.5 (ref 1.1–2.2)
ALBUMIN SERPL-MCNC: 4.8 G/DL (ref 3.4–5)
ALP BLD-CCNC: 109 U/L (ref 40–129)
ALT SERPL-CCNC: 55 U/L (ref 10–40)
ANION GAP SERPL CALCULATED.3IONS-SCNC: 21 MMOL/L (ref 3–16)
AST SERPL-CCNC: 58 U/L (ref 15–37)
BASOPHILS ABSOLUTE: 0.1 K/UL (ref 0–0.2)
BASOPHILS RELATIVE PERCENT: 0.8 %
BILIRUB SERPL-MCNC: 0.6 MG/DL (ref 0–1)
BUN BLDV-MCNC: 11 MG/DL (ref 7–20)
CALCIUM SERPL-MCNC: 10.1 MG/DL (ref 8.3–10.6)
CHLORIDE BLD-SCNC: 93 MMOL/L (ref 99–110)
CHOLESTEROL, TOTAL: 326 MG/DL (ref 0–199)
CO2: 23 MMOL/L (ref 21–32)
CREAT SERPL-MCNC: 1 MG/DL (ref 0.6–1.1)
EOSINOPHILS ABSOLUTE: 0.2 K/UL (ref 0–0.6)
EOSINOPHILS RELATIVE PERCENT: 3.3 %
GFR AFRICAN AMERICAN: >60
GFR NON-AFRICAN AMERICAN: 57
GLOBULIN: 3.1 G/DL
GLUCOSE BLD-MCNC: 161 MG/DL (ref 70–99)
HCT VFR BLD CALC: 41.7 % (ref 36–48)
HDLC SERPL-MCNC: 48 MG/DL (ref 40–60)
HEMOGLOBIN: 14.7 G/DL (ref 12–16)
HEPATITIS C ANTIBODY INTERPRETATION: NORMAL
LDL CHOLESTEROL CALCULATED: 229 MG/DL
LYMPHOCYTES ABSOLUTE: 2.2 K/UL (ref 1–5.1)
LYMPHOCYTES RELATIVE PERCENT: 31.1 %
MCH RBC QN AUTO: 32.8 PG (ref 26–34)
MCHC RBC AUTO-ENTMCNC: 35.2 G/DL (ref 31–36)
MCV RBC AUTO: 93.3 FL (ref 80–100)
MONOCYTES ABSOLUTE: 0.5 K/UL (ref 0–1.3)
MONOCYTES RELATIVE PERCENT: 6.8 %
NEUTROPHILS ABSOLUTE: 4 K/UL (ref 1.7–7.7)
NEUTROPHILS RELATIVE PERCENT: 58 %
PDW BLD-RTO: 13.3 % (ref 12.4–15.4)
PLATELET # BLD: 302 K/UL (ref 135–450)
PMV BLD AUTO: 9.3 FL (ref 5–10.5)
POTASSIUM SERPL-SCNC: 3.3 MMOL/L (ref 3.5–5.1)
RBC # BLD: 4.47 M/UL (ref 4–5.2)
SODIUM BLD-SCNC: 137 MMOL/L (ref 136–145)
T4 FREE: 1.1 NG/DL (ref 0.9–1.8)
TOTAL PROTEIN: 7.9 G/DL (ref 6.4–8.2)
TRIGL SERPL-MCNC: 244 MG/DL (ref 0–150)
TSH SERPL DL<=0.05 MIU/L-ACNC: 5.83 UIU/ML (ref 0.27–4.2)
VLDLC SERPL CALC-MCNC: 49 MG/DL
WBC # BLD: 7 K/UL (ref 4–11)

## 2021-05-29 LAB
ESTIMATED AVERAGE GLUCOSE: 151.3 MG/DL
HBA1C MFR BLD: 6.9 %
HIV AG/AB: NORMAL
HIV ANTIGEN: NORMAL
HIV-1 ANTIBODY: NORMAL
HIV-2 AB: NORMAL

## 2021-06-03 ENCOUNTER — OFFICE VISIT (OUTPATIENT)
Dept: FAMILY MEDICINE CLINIC | Age: 56
End: 2021-06-03
Payer: COMMERCIAL

## 2021-06-03 VITALS
SYSTOLIC BLOOD PRESSURE: 112 MMHG | BODY MASS INDEX: 45.27 KG/M2 | OXYGEN SATURATION: 93 % | DIASTOLIC BLOOD PRESSURE: 82 MMHG | HEART RATE: 85 BPM | HEIGHT: 64 IN | WEIGHT: 265.2 LBS

## 2021-06-03 DIAGNOSIS — I10 HYPERTENSION, UNSPECIFIED TYPE: Primary | ICD-10-CM

## 2021-06-03 DIAGNOSIS — R79.89 ELEVATED TSH: ICD-10-CM

## 2021-06-03 DIAGNOSIS — E87.6 HYPOKALEMIA: ICD-10-CM

## 2021-06-03 DIAGNOSIS — E11.9 TYPE 2 DIABETES MELLITUS WITHOUT COMPLICATION, WITHOUT LONG-TERM CURRENT USE OF INSULIN (HCC): ICD-10-CM

## 2021-06-03 DIAGNOSIS — E78.2 MIXED HYPERLIPIDEMIA: ICD-10-CM

## 2021-06-03 PROCEDURE — 99213 OFFICE O/P EST LOW 20 MIN: CPT | Performed by: NURSE PRACTITIONER

## 2021-06-03 ASSESSMENT — ENCOUNTER SYMPTOMS
RESPIRATORY NEGATIVE: 1
GASTROINTESTINAL NEGATIVE: 1

## 2021-06-03 NOTE — PATIENT INSTRUCTIONS
Patient Education        Counting Carbohydrates: Care Instructions  Your Care Instructions     You don't have to eat special foods when you have diabetes. You just have to be careful to eat healthy foods. Carbohydrates (carbs) raise blood sugar higher and quicker than any other nutrient. Carbs are found in desserts, breads and cereals, and fruit. They're also in starchy vegetables. These include potatoes, corn, and grains such as rice and pasta. Carbs are also in milk and yogurt. The more carbs you eat at one time, the higher your blood sugar will rise. Spreading carbs all through the day helps keep your blood sugar levels within your target range. Counting carbs is one of the best ways to keep your blood sugar under control. If you use insulin, counting carbs helps you match the right amount of insulin to the number of grams of carbs in a meal. Then you can change your diet and insulin dose as needed. Testing your blood sugar several times a day can help you learn how carbs affect your blood sugar. A registered dietitian or certified diabetes educator can help you plan meals and snacks. Follow-up care is a key part of your treatment and safety. Be sure to make and go to all appointments, and call your doctor if you are having problems. It's also a good idea to know your test results and keep a list of the medicines you take. How can you care for yourself at home? Know your daily amount of carbohydrates  Your daily amount depends on several things, such as your weight, how active you are, which diabetes medicines you take, and what your goals are for your blood sugar levels. A registered dietitian or certified diabetes educator can help you plan how many carbs to include in each meal and snack. For most adults, a guideline for the daily amount of carbs is:  · 45 to 60 grams at each meal. That's about the same as 3 to 4 carbohydrate servings. · 15 to 20 grams at each snack.  That's about the same as 1 carbohydrate serving. Count carbs  Counting carbs lets you know how much rapid-acting insulin to take before you eat. If you use an insulin pump, you get a constant rate of insulin during the day. So the pump must be programmed at meals. This gives you extra insulin to cover the rise in blood sugar after meals. If you take insulin:  · Learn your own insulin-to-carb ratio. You and your diabetes health professional will figure out the ratio. You can do this by testing your blood sugar after meals. For example, you may need a certain amount of insulin for every 15 grams of carbs. · Add up the carb grams in a meal. Then you can figure out how many units of insulin to take based on your insulin-to-carb ratio. · Exercise lowers blood sugar. You can use less insulin than you would if you were not doing exercise. Keep in mind that timing matters. If you exercise within 1 hour after a meal, your body may need less insulin for that meal than it would if you exercised 3 hours after the meal. Test your blood sugar to find out how exercise affects your need for insulin. If you do or don't take insulin:  · Look at labels on packaged foods. This can tell you how many carbs are in a serving. You can also use guides from the American Diabetes Association. · Be aware of portions, or serving sizes. If a package has two servings and you eat the whole package, you need to double the number of grams of carbohydrate listed for one serving. · Protein, fat, and fiber do not raise blood sugar as much as carbs do. If you eat a lot of these nutrients in a meal, your blood sugar will rise more slowly than it would otherwise. Eat from all food groups  · Eat at least three meals a day. · Plan meals to include food from all the food groups. The food groups include grains, fruits, dairy, proteins, and vegetables. · Talk to your dietitian or diabetes educator about ways to add limited amounts of sweets into your meal plan.   · If you drink easier to keep your blood sugar level within your target range. It also helps you see if you're eating healthy portion sizes. To use the plate format, you put non-starchy vegetables on half your plate. Add meat or meat substitutes on one-quarter of the plate. Put a grain or starchy vegetable (such as brown rice or a potato) on the final quarter of the plate. You can add a small piece of fruit and some low-fat or fat-free milk or yogurt, depending on your carbohydrate goal for each meal.  Here are some tips for using the plate format:  · Make sure that you are not using an oversized plate. A 9-inch plate is best. Many restaurants use larger plates. · Get used to using the plate format at home. Then you can use it when you eat out. · Write down your questions about using the plate format. Talk to your doctor, a dietitian, or a diabetes educator about your concerns. Carbohydrate counting  With carbohydrate counting, you plan meals based on the amount of carbohydrate in each food. Carbohydrate raises blood sugar higher and more quickly than any other nutrient. It is found in desserts, breads and cereals, and fruit. It's also found in starchy vegetables such as potatoes and corn, grains such as rice and pasta, and milk and yogurt. Spreading carbohydrate throughout the day helps keep your blood sugar levels within your target range. Your daily amount depends on several things, including your weight, how active you are, which diabetes medicines you take, and what your goals are for your blood sugar levels. A registered dietitian or diabetes educator can help you plan how much carbohydrate to include in each meal and snack. A guideline for your daily amount of carbohydrate is:  · 45 to 60 grams at each meal. That's about the same as 3 to 4 carbohydrate servings. · 15 to 20 grams at each snack. That's about the same as 1 carbohydrate serving.   The Nutrition Facts label on packaged foods tells you how much carbohydrate List plenty of fruits and vegetables. · Post this list on the refrigerator. Add to it as you think of more things you need. · Take the list to the store to do your weekly shopping. Follow-up care is a key part of your treatment and safety. Be sure to make and go to all appointments, and call your doctor if you are having problems. It's also a good idea to know your test results and keep a list of the medicines you take. Where can you learn more? Go to https://Hachimenroppimirthaeb.[employer]. org and sign in to your [employer] account. Enter K118 in the [employer] box to learn more about \"Learning About Meal Planning for Diabetes. \"     If you do not have an account, please click on the \"Sign Up Now\" link. Current as of: August 31, 2020               Content Version: 12.8  © 2006-2021 [employer]. Care instructions adapted under license by [facility] ([facility]). If you have questions about a medical condition or this instruction, always ask your healthcare professional. [place] [address] any warranty or liability for your use of this information. Patient Education        High Cholesterol: Care Instructions  Your Care Instructions     Cholesterol is a type of fat in your blood. It is needed for many body functions, such as making new cells. Cholesterol is made by your body. It also comes from food you eat. High cholesterol means that you have too much of the fat in your blood. This raises your risk of a heart attack and stroke. LDL and HDL are part of your total cholesterol. LDL is the \"bad\" cholesterol. High LDL can raise your risk for heart disease, heart attack, and stroke. HDL is the \"good\" cholesterol. It helps clear bad cholesterol from the body. High HDL is linked with a lower risk of heart disease, heart attack, and stroke. Your cholesterol levels help your doctor find out your risk for having a heart attack or stroke.  You and your doctor can talk about whether you need to lower your risk and what treatment is best for you. A heart-healthy lifestyle along with medicines can help lower your cholesterol and your risk. The way you choose to lower your risk will depend on how high your risk is for heart attack and stroke. It will also depend on how you feel about taking medicines. Follow-up care is a key part of your treatment and safety. Be sure to make and go to all appointments, and call your doctor if you are having problems. It's also a good idea to know your test results and keep a list of the medicines you take. How can you care for yourself at home? · Eat a variety of foods every day. Good choices include fruits, vegetables, whole grains (like oatmeal), dried beans and peas, nuts and seeds, soy products (like tofu), and fat-free or low-fat dairy products. · Replace butter, margarine, and hydrogenated or partially hydrogenated oils with olive and canola oils. (Canola oil margarine without trans fat is fine.)  · Replace red meat with fish, poultry, and soy protein (like tofu). · Limit processed and packaged foods like chips, crackers, and cookies. · Bake, broil, or steam foods. Don't brunner them. · Be physically active. Get at least 30 minutes of exercise on most days of the week. Walking is a good choice. You also may want to do other activities, such as running, swimming, cycling, or playing tennis or team sports. · Stay at a healthy weight or lose weight by making the changes in eating and physical activity listed above. Losing just a small amount of weight, even 5 to 10 pounds, can reduce your risk for having a heart attack or stroke. · Do not smoke. When should you call for help? Watch closely for changes in your health, and be sure to contact your doctor if:    · You need help making lifestyle changes.     · You have questions about your medicine. Where can you learn more? Go to https://chariane.healthBLOVES. org and sign in to your [employer] account. Enter B386 in the [facility] box to learn more about \"High Cholesterol: Care Instructions. \"     If you do not have an account, please click on the \"Sign Up Now\" link. Current as of: August 31, 2020               Content Version: 12.8  © 8291-4461 Healthwise[employer]. Care instructions adapted under license by [facility] ([facility]). If you have questions about a medical condition or this instruction, always ask your healthcare professional. [address] any warranty or liability for your use of this information.

## 2021-06-03 NOTE — PROGRESS NOTES
6/3/2021    Jerica Goss (:  1965) is a 63 y.o. female, here for a preventive medicine evaluation. Pt is here today to discuss recent lab results. She established care with me on 21. Labs from 2021:  A1C - 6.9  Potassium - 3.3  GFR - 57  ALT - 55  AST - 58  TC - 326  Triglycerides - 244  HDL - 48  LDL - 229  TSH - 5.83  T4 - 1.1    Patient Active Problem List   Diagnosis    Lateral meniscus tear    Localized osteoarthrosis, lower leg    Other viral warts    Essential tremor    Adiposity    Plantar fasciitis, right    Sprain of right ankle    Right ankle pain    Ankle pain    Plantar fasciitis, left    Hypertension       Review of Systems   Constitutional: Negative. Respiratory: Negative. Cardiovascular: Negative. Gastrointestinal: Negative. Neurological: Negative. Prior to Visit Medications    Medication Sig Taking?  Authorizing Provider   hydroCHLOROthiazide (HYDRODIURIL) 25 MG tablet Take 1 tablet by mouth every morning Yes MARIAH Gunn CNP   Blood Pressure KIT 1 kit by Does not apply route 2 times daily Yes MARIAH Gunn CNP   Acetaminophen (TYLENOL 8 HOUR ARTHRITIS PAIN PO)  Yes Historical Provider, MD   omeprazole (PRILOSEC) 10 MG delayed release capsule Take 20 mg by mouth daily Yes Historical Provider, MD   Multiple Vitamins-Minerals (MULTIVITAMIN PO) Take by mouth  Historical Provider, MD        Allergies   Allergen Reactions    Clarithromycin Hives and Rash    Adhesive Tape Rash    Sulfa Antibiotics Hives, Itching and Rash       Past Medical History:   Diagnosis Date    Asthma     Cholelithiasis     Dizziness     Migraine aura, persistent     Nausea & vomiting        Past Surgical History:   Procedure Laterality Date    CHOLECYSTECTOMY      CHOLECYSTECTOMY      ENDOMETRIAL ABLATION      KNEE ARTHROSCOPY Right 12/3/13    VAS partial menisectomy partial lateral meniscectomy, chondroplasty synovectomy    TONSILLECTOMY  WRIST SURGERY  1/13    rt wrist       Social History     Socioeconomic History    Marital status: Single     Spouse name: Not on file    Number of children: Not on file    Years of education: Not on file    Highest education level: Not on file   Occupational History    Not on file   Tobacco Use    Smoking status: Never Smoker    Smokeless tobacco: Never Used   Vaping Use    Vaping Use: Never used   Substance and Sexual Activity    Alcohol use: No    Drug use: No    Sexual activity: Yes     Partners: Male   Other Topics Concern    Not on file   Social History Narrative    Not on file     Social Determinants of Health     Financial Resource Strain: Low Risk     Difficulty of Paying Living Expenses: Not hard at all   Food Insecurity: No Food Insecurity    Worried About [employer] in the Last Year: Never true    T[employer]ani [employer] in the Last Year: Never true   Transportation Needs: No Transportation Needs    Lack of Transportation (Medical): No    Lack of Transportation (Non-Medical): No   Physical Activity:     Days of Exercise per Week:     Minutes of Exercise per Session:    Stress:     Feeling of Stress :    Social Connections:     Frequency of Communication with Friends and Family:     Frequency of Social Gatherings with Friends and Family:     Attends [employer] Services:     Active Member of Clubs or Organizations:     Attends Club or Organization Meetings:     Marital Status:    Intimate Partner Violence:     Fear of Current or Ex-Partner:     Emotionally Abused:     Physically Abused:     Sexually Abused:         No family history on file.     ADVANCE DIRECTIVE: N, <no information>    Vitals:    06/03/21 1559   BP: 112/82   Site: Right Upper Arm   Position: Sitting   Cuff Size: Large Adult   Pulse: 85   SpO2: 93%   Weight: 265 lb 3.2 oz (120.3 kg)   Height: 5' 4\" (1.626 m)     Estimated body mass index is 45.52 kg/m² as calculated from the following:    Height as of this encounter: 5' 4\" (1.626 m). Weight as of this encounter: 265 lb 3.2 oz (120.3 kg). Physical Exam  Constitutional:       Appearance: Normal appearance. She is obese. HENT:      Head: Normocephalic. Cardiovascular:      Rate and Rhythm: Normal rate and regular rhythm. Pulses: Normal pulses. Heart sounds: Normal heart sounds. Pulmonary:      Effort: Pulmonary effort is normal.      Breath sounds: Normal breath sounds. Musculoskeletal:      Cervical back: Normal range of motion. Skin:     General: Skin is warm and dry. Neurological:      Mental Status: She is alert and oriented to person, place, and time. No flowsheet data found. Lab Results   Component Value Date    CHOL 326 05/28/2021    TRIG 244 05/28/2021    HDL 48 05/28/2021    LDLCALC 229 05/28/2021    GLUCOSE 161 05/28/2021    LABA1C 6.9 05/28/2021       The ASCVD Risk score (Shabbir Kidd, et al., 2013) failed to calculate for the following reasons: The valid total cholesterol range is 130 to 320 mg/dL      There is no immunization history on file for this patient.     Health Maintenance   Topic Date Due    Diabetic foot exam  Never done    Diabetic retinal exam  Never done    COVID-19 Vaccine (1) Never done    Diabetic microalbuminuria test  Never done    Cervical cancer screen  Never done    Breast cancer screen  Never done    Shingles Vaccine (1 of 2) Never done    Colon cancer screen colonoscopy  Never done    Flu vaccine (Season Ended) 09/01/2021    A1C test (Diabetic or Prediabetic)  05/28/2022    Lipid screen  05/28/2022    Potassium monitoring  05/28/2022    Creatinine monitoring  05/28/2022    DTaP/Tdap/Td vaccine (2 - Td or Tdap) 03/23/2025    Hepatitis C screen  Completed    HIV screen  Completed    Hepatitis A vaccine  Aged Out    Hepatitis B vaccine  Aged Out    Hib vaccine  Aged Out    Meningococcal (ACWY) vaccine  Aged Out    Pneumococcal 0-64 years Vaccine  Aged [place] ASSESSMENT/PLAN:    1. Hypertension, unspecified type  BP is stable. Recent GFR was slightly decreased. Will recheck today. If still below normal, will change BP medication from HCTZ to Lisinopril. -     Basic Metabolic Panel    2. Mixed hyperlipidemia  Pt states that she has a h/o HLD, but declines starting medication at this time. She states that she recently had left knee meniscus repair and would like to focus on a healthier diet and exercise. Agrees to f/u in 3 months with a repeat lipid panel.  -     Lipid Panel; Future    3. Type 2 diabetes mellitus without complication, without long-term current use of insulin ([facility] [address]  See #2. Pt declines starting medication at this time and would like to work on lifestyle modifications. Will repeat A1C in 3 months.  -     Hemoglobin A1C    4. Hypokalemia  Recent potassium was low. Pt denied excessive urination with HCTZ. States that she did not receive my message about eating bananas. Will recheck today. -     Basic Metabolic Panel    5. Elevated TSH  -     T4, Free; Future  -     TSH without Reflex; Future      Return in about 3 months (around 9/3/2021) for Diabetes, Hypertension, Hyperlipidemia. An electronic signature was used to authenticate this note.     --MARIAH Shaikh - CNP on 6/3/2021 at 4:34 PM

## 2021-06-04 DIAGNOSIS — E11.9 TYPE 2 DIABETES MELLITUS WITHOUT COMPLICATION, WITHOUT LONG-TERM CURRENT USE OF INSULIN (HCC): Primary | ICD-10-CM

## 2021-06-04 LAB
ANION GAP SERPL CALCULATED.3IONS-SCNC: 18 MMOL/L (ref 3–16)
BUN BLDV-MCNC: 17 MG/DL (ref 7–20)
CALCIUM SERPL-MCNC: 10.1 MG/DL (ref 8.3–10.6)
CHLORIDE BLD-SCNC: 97 MMOL/L (ref 99–110)
CO2: 24 MMOL/L (ref 21–32)
CREAT SERPL-MCNC: 0.9 MG/DL (ref 0.6–1.1)
ESTIMATED AVERAGE GLUCOSE: 159.9 MG/DL
GFR AFRICAN AMERICAN: >60
GFR NON-AFRICAN AMERICAN: >60
GLUCOSE BLD-MCNC: 130 MG/DL (ref 70–99)
HBA1C MFR BLD: 7.2 %
POTASSIUM SERPL-SCNC: 4.5 MMOL/L (ref 3.5–5.1)
SODIUM BLD-SCNC: 139 MMOL/L (ref 136–145)

## 2021-06-14 DIAGNOSIS — I10 HYPERTENSION, UNSPECIFIED TYPE: ICD-10-CM

## 2021-06-14 RX ORDER — HYDROCHLOROTHIAZIDE 25 MG/1
25 TABLET ORAL EVERY MORNING
Qty: 90 TABLET | Refills: 1 | Status: SHIPPED | OUTPATIENT
Start: 2021-06-14 | End: 2021-10-13

## 2021-06-15 ENCOUNTER — CLINICAL DOCUMENTATION (OUTPATIENT)
Dept: OTHER | Age: 56
End: 2021-06-15

## 2021-08-05 ENCOUNTER — HOSPITAL ENCOUNTER (OUTPATIENT)
Age: 56
Discharge: HOME | End: 2021-08-05
Payer: COMMERCIAL

## 2021-08-05 ENCOUNTER — OFFICE VISIT (OUTPATIENT)
Dept: FAMILY MEDICINE CLINIC | Age: 56
End: 2021-08-05
Payer: COMMERCIAL

## 2021-08-05 ENCOUNTER — HOSPITAL ENCOUNTER (OUTPATIENT)
Dept: GENERAL RADIOLOGY | Age: 56
Discharge: HOME | End: 2021-08-05
Payer: COMMERCIAL

## 2021-08-05 VITALS
DIASTOLIC BLOOD PRESSURE: 64 MMHG | TEMPERATURE: 97 F | HEART RATE: 95 BPM | BODY MASS INDEX: 41.71 KG/M2 | OXYGEN SATURATION: 94 % | WEIGHT: 243 LBS | SYSTOLIC BLOOD PRESSURE: 122 MMHG

## 2021-08-05 DIAGNOSIS — R05.9 COUGH: ICD-10-CM

## 2021-08-05 DIAGNOSIS — R05.9 COUGH: Primary | ICD-10-CM

## 2021-08-05 DIAGNOSIS — J18.9 PNEUMONIA OF RIGHT UPPER LOBE DUE TO INFECTIOUS ORGANISM: Primary | ICD-10-CM

## 2021-08-05 PROCEDURE — 99213 OFFICE O/P EST LOW 20 MIN: CPT | Performed by: NURSE PRACTITIONER

## 2021-08-05 PROCEDURE — 71046 X-RAY EXAM CHEST 2 VIEWS: CPT

## 2021-08-05 RX ORDER — AMOXICILLIN 500 MG/1
1000 CAPSULE ORAL 3 TIMES DAILY
Qty: 42 CAPSULE | Refills: 0 | Status: SHIPPED | OUTPATIENT
Start: 2021-08-05 | End: 2021-08-12

## 2021-08-05 RX ORDER — ALBUTEROL SULFATE 90 UG/1
2 AEROSOL, METERED RESPIRATORY (INHALATION) EVERY 6 HOURS PRN
Qty: 1 INHALER | Refills: 0 | Status: SHIPPED | OUTPATIENT
Start: 2021-08-05 | End: 2021-10-13

## 2021-08-05 RX ORDER — PREDNISONE 20 MG/1
40 TABLET ORAL DAILY
Qty: 10 TABLET | Refills: 0 | Status: SHIPPED | OUTPATIENT
Start: 2021-08-05 | End: 2021-08-10

## 2021-08-05 RX ORDER — DOXYCYCLINE HYCLATE 100 MG
100 TABLET ORAL 2 TIMES DAILY
Qty: 14 TABLET | Refills: 0 | Status: SHIPPED | OUTPATIENT
Start: 2021-08-05 | End: 2021-08-12

## 2021-08-05 RX ORDER — DEXTROMETHORPHAN HYDROBROMIDE AND PROMETHAZINE HYDROCHLORIDE 15; 6.25 MG/5ML; MG/5ML
5 SYRUP ORAL 4 TIMES DAILY PRN
Qty: 200 ML | Refills: 0 | Status: SHIPPED | OUTPATIENT
Start: 2021-08-05 | End: 2021-08-15

## 2021-08-05 ASSESSMENT — ENCOUNTER SYMPTOMS
COUGH: 1
WHEEZING: 0
CHOKING: 0
ABDOMINAL PAIN: 0
DIARRHEA: 0
CHEST TIGHTNESS: 0
VOMITING: 0
RECTAL PAIN: 0
CONSTIPATION: 0
ABDOMINAL DISTENTION: 0
APNEA: 0
NAUSEA: 1
ANAL BLEEDING: 0
BLOOD IN STOOL: 0
STRIDOR: 0
SHORTNESS OF BREATH: 0

## 2021-08-05 NOTE — PROGRESS NOTES
2021     Palak Miller (:  1965) is a 63 y.o. female, here for evaluation of the following medical concerns:    HPI  Pt c/o fever (tmax 100) and chills for the past week. Also c/o dry hacking cough, decreased appetite and nausea. She has not rec'd the COVID vaccine. Denies wheezing and SOB    Review of Systems   Constitutional: Positive for activity change, appetite change, chills, diaphoresis, fatigue and fever. Negative for unexpected weight change. Pt has recently lost 20 lbs, but has been trying to with following a healthier diet    HENT: Negative. Respiratory: Positive for cough. Negative for apnea, choking, chest tightness, shortness of breath, wheezing and stridor. Cardiovascular: Negative. Gastrointestinal: Positive for nausea. Negative for abdominal distention, abdominal pain, anal bleeding, blood in stool, constipation, diarrhea, rectal pain and vomiting. Neurological: Positive for headaches. C/o headache with coughing. Psychiatric/Behavioral: Negative. Prior to Visit Medications    Medication Sig Taking?  Authorizing Provider   amoxicillin (AMOXIL) 500 MG capsule Take 2 capsules by mouth 3 times daily for 7 days Yes MARIAH Shaw CNP   doxycycline hyclate (VIBRA-TABS) 100 MG tablet Take 1 tablet by mouth 2 times daily for 7 days Yes MARIAH Shaw CNP   predniSONE (DELTASONE) 20 MG tablet Take 2 tablets by mouth daily for 5 days Yes MARIAH Shaw CNP   albuterol sulfate  (90 Base) MCG/ACT inhaler Inhale 2 puffs into the lungs every 6 hours as needed for Wheezing Yes MARIAH Shaw CNP   promethazine-dextromethorphan (PROMETHAZINE-DM) 6.25-15 MG/5ML syrup Take 5 mLs by mouth 4 times daily as needed for Cough Yes MARIAH Shaw CNP   hydroCHLOROthiazide (HYDRODIURIL) 25 MG tablet Take 1 tablet by mouth every morning Yes MARIAH Gonzales CNP   Blood Pressure KIT 1 kit by Does not apply route 2 times daily Yes [place] StepanMARIAH nielson - CNP   Acetaminophen (TYLENOL 8 HOUR ARTHRITIS PAIN PO)  Yes Historical Provider, MD   omeprazole (PRILOSEC) 10 MG delayed release capsule Take 20 mg by mouth daily Yes Historical Provider, MD   Multiple Vitamins-Minerals (MULTIVITAMIN PO) Take by mouth  Historical Provider, MD        Social History     Tobacco Use    Smoking status: Never Smoker    Smokeless tobacco: Never Used   Substance Use Topics    Alcohol use: No        Vitals:    08/05/21 1537   BP: 122/64   Site: Right Upper Arm   Position: Sitting   Cuff Size: Large Adult   Pulse: 95   Temp: 97 °F (36.1 °C)   TempSrc: Infrared   SpO2: 94%   Weight: 243 lb (110.2 kg)     Estimated body mass index is 41.71 kg/m² as calculated from the following:    Height as of 6/3/21: 5' 4\" (1.626 m). Weight as of this encounter: 243 lb (110.2 kg). Physical Exam  Vitals reviewed. Constitutional:       General: She is not in acute distress. Appearance: Normal appearance. She is ill-appearing. She is not toxic-appearing or diaphoretic. HENT:      Head: Normocephalic. Cardiovascular:      Rate and Rhythm: Normal rate and regular rhythm. Heart sounds: Normal heart sounds. Pulmonary:      Effort: Pulmonary effort is normal.      Breath sounds: Examination of the right-upper field reveals decreased breath sounds and wheezing. Examination of the right-middle field reveals decreased breath sounds. Decreased breath sounds and wheezing present. No rhonchi or rales. Musculoskeletal:      Cervical back: Normal range of motion. Skin:     General: Skin is warm and dry. Neurological:      Mental Status: She is alert and oriented to person, place, and time. Psychiatric:         Mood and Affect: Mood normal.         Behavior: Behavior normal.         Thought Content: Thought content normal.         Judgment: Judgment normal.         ASSESSMENT/PLAN:  1.  Pneumonia of right upper lobe due to infectious organism  Pt had a reaction to a macrolide in the past.  Will treat the pt with Amoxicillin and Doxycycline. Advised pt to take the antibiotics as prescribed and to f/u with me in 2-4 weeks for f/u and repeat chest xray (see radiologist's recommendations). Advised pt to take the prednisone as directed with food, use the inhaler as directed for SOB/wheezing and phenergan DM as directed for cough (will also help with nausea). Pt VU and agrees with POC. - amoxicillin (AMOXIL) 500 MG capsule; Take 2 capsules by mouth 3 times daily for 7 days  Dispense: 42 capsule; Refill: 0  - doxycycline hyclate (VIBRA-TABS) 100 MG tablet; Take 1 tablet by mouth 2 times daily for 7 days  Dispense: 14 tablet; Refill: 0  - predniSONE (DELTASONE) 20 MG tablet; Take 2 tablets by mouth daily for 5 days  Dispense: 10 tablet; Refill: 0  - albuterol sulfate  (90 Base) MCG/ACT inhaler; Inhale 2 puffs into the lungs every 6 hours as needed for Wheezing  Dispense: 1 Inhaler; Refill: 0  - promethazine-dextromethorphan (PROMETHAZINE-DM) 6.25-15 MG/5ML syrup; Take 5 mLs by mouth 4 times daily as needed for Cough  Dispense: 200 mL; Refill: 0    2. Cough  Chest xray done PTA. Showed right upper lobe pneumonia. Pt will go to the [facility] today for a COVID test.  Will f/u with me regarding results. - XR CHEST STANDARD (2 VW)  - COVID-19; Future      Return in about 2 weeks (around 8/19/2021) for 2-4 weeks for f/u chest xray. An electronic signature was used to authenticate this note.     --MARIAH Ham - CNP on 8/5/2021 at 4:28 PM

## 2021-08-05 NOTE — PATIENT INSTRUCTIONS
Patient Education        Pneumonia: Care Instructions  Overview     Pneumonia is an infection of the lungs. Most cases are caused by infections from bacteria or viruses. Pneumonia may be mild or very severe. If it is caused by bacteria, you will be treated with antibiotics. It may take a few weeks to a few months to recover fully from pneumonia, depending on how sick you were and whether your overall health is good. Follow-up care is a key part of your treatment and safety. Be sure to make and go to all appointments, and call your doctor if you are having problems. It's also a good idea to know your test results and keep a list of the medicines you take. How can you care for yourself at home? · Take your antibiotics exactly as directed. Do not stop taking the medicine just because you are feeling better. You need to take the full course of antibiotics. · Take your medicines exactly as prescribed. Call your doctor if you think you are having a problem with your medicine. · Get plenty of rest and sleep. You may feel weak and tired for a while, but your energy level will improve with time. · To prevent dehydration, drink plenty of fluids, enough so that your urine is light yellow or clear like water. Choose water and other caffeine-free clear liquids until you feel better. If you have kidney, heart, or liver disease and have to limit fluids, talk with your doctor before you increase the amount of fluids you drink. · Take care of your cough so you can rest. A cough that brings up mucus from your lungs is common with pneumonia. It is one way your body gets rid of the infection. But if coughing keeps you from resting or causes severe fatigue and chest-wall pain, talk to your doctor. Your doctor may suggest that you take a medicine to reduce the cough. · Use a vaporizer or humidifier to add moisture to your bedroom. Follow the directions for cleaning the machine.   · Do not smoke or allow others to smoke around you. Smoke will make your cough last longer. If you need help quitting, talk to your doctor about stop-smoking programs and medicines. These can increase your chances of quitting for good. · Take an over-the-counter pain medicine, such as acetaminophen (Tylenol), ibuprofen (Advil, Motrin), or naproxen (Aleve). Read and follow all instructions on the label. · Do not take two or more pain medicines at the same time unless the doctor told you to. Many pain medicines have acetaminophen, which is Tylenol. Too much acetaminophen (Tylenol) can be harmful. · If you were given a spirometer to measure how well your lungs are working, use it as instructed. This can help your doctor tell how your recovery is going. · To prevent pneumonia in the future, talk to your doctor about getting a flu vaccine (once a year) and a pneumococcal vaccine (one time only for most people). When should you call for help? Call 911 anytime you think you may need emergency care. For example, call if:    · You have severe trouble breathing. Call your doctor now or seek immediate medical care if:    · You cough up dark brown or bloody mucus (sputum).     · You have new or worse trouble breathing.     · You are dizzy or lightheaded, or you feel like you may faint. Watch closely for changes in your health, and be sure to contact your doctor if:    · You have a new or higher fever.     · You are coughing more deeply or more often.     · You are not getting better after 2 days (48 hours).     · You do not get better as expected. Where can you learn more? Go to [email or web address]://[email or web address] org and sign in to your [employer] account. Enter D336 in the [facility] box to learn more about \"Pneumonia: Care Instructions. \"     If you do not have an account, please click on the \"Sign Up Now\" link. Current as of: October 26, 2020               Content Version: 12.9  © 8578-3181 Health[employer], Inc[employer].    Care instructions adapted under license by [facility] ([facility]). If you have questions about a medical condition or this instruction, always ask your healthcare professional. [address] any warranty or liability for your use of this information.

## 2021-08-16 ENCOUNTER — TELEPHONE (OUTPATIENT)
Dept: FAMILY MEDICINE CLINIC | Age: 56
End: 2021-08-16

## 2021-08-16 RX ORDER — BENZONATATE 100 MG/1
100-200 CAPSULE ORAL 3 TIMES DAILY PRN
Qty: 60 CAPSULE | Refills: 0 | Status: SHIPPED | OUTPATIENT
Start: 2021-08-16 | End: 2021-08-23

## 2021-08-24 ENCOUNTER — TELEPHONE (OUTPATIENT)
Dept: FAMILY MEDICINE CLINIC | Age: 56
End: 2021-08-24

## 2021-08-24 ENCOUNTER — HOSPITAL ENCOUNTER (OUTPATIENT)
Dept: GENERAL RADIOLOGY | Age: 56
Discharge: HOME | End: 2021-08-24
Payer: COMMERCIAL

## 2021-08-24 ENCOUNTER — HOSPITAL ENCOUNTER (OUTPATIENT)
Age: 56
Discharge: HOME | End: 2021-08-24
Payer: COMMERCIAL

## 2021-08-24 DIAGNOSIS — J18.9 PNEUMONIA OF RIGHT UPPER LOBE DUE TO INFECTIOUS ORGANISM: ICD-10-CM

## 2021-08-24 DIAGNOSIS — R05.9 COUGH: Primary | ICD-10-CM

## 2021-08-24 DIAGNOSIS — R05.9 COUGH: ICD-10-CM

## 2021-08-24 PROCEDURE — 71046 X-RAY EXAM CHEST 2 VIEWS: CPT

## 2021-08-24 RX ORDER — BENZONATATE 100 MG/1
100-200 CAPSULE ORAL 3 TIMES DAILY PRN
Qty: 60 CAPSULE | Refills: 0 | Status: SHIPPED
Start: 2021-08-24 | End: 2021-08-25 | Stop reason: ALTCHOICE

## 2021-08-25 ENCOUNTER — VIRTUAL VISIT (OUTPATIENT)
Dept: FAMILY MEDICINE CLINIC | Age: 56
End: 2021-08-25
Payer: COMMERCIAL

## 2021-08-25 DIAGNOSIS — J18.9 PNEUMONIA OF RIGHT LOWER LOBE DUE TO INFECTIOUS ORGANISM: Primary | ICD-10-CM

## 2021-08-25 DIAGNOSIS — R05.9 COUGH: ICD-10-CM

## 2021-08-25 PROCEDURE — 99213 OFFICE O/P EST LOW 20 MIN: CPT | Performed by: NURSE PRACTITIONER

## 2021-08-25 RX ORDER — AMOXICILLIN 500 MG/1
1000 CAPSULE ORAL 3 TIMES DAILY
Qty: 42 CAPSULE | Refills: 0 | Status: SHIPPED | OUTPATIENT
Start: 2021-08-25 | End: 2021-09-01

## 2021-08-25 RX ORDER — PROMETHAZINE HYDROCHLORIDE AND CODEINE PHOSPHATE 6.25; 1 MG/5ML; MG/5ML
5 SYRUP ORAL EVERY 4 HOURS PRN
Qty: 90 ML | Refills: 0 | Status: SHIPPED | OUTPATIENT
Start: 2021-08-25 | End: 2021-08-28

## 2021-08-25 RX ORDER — DOXYCYCLINE HYCLATE 100 MG
100 TABLET ORAL 2 TIMES DAILY
Qty: 14 TABLET | Refills: 0 | Status: SHIPPED | OUTPATIENT
Start: 2021-08-25 | End: 2021-09-01

## 2021-08-25 ASSESSMENT — ENCOUNTER SYMPTOMS
COUGH: 1
CHOKING: 0
STRIDOR: 0
GASTROINTESTINAL NEGATIVE: 1
WHEEZING: 0
APNEA: 0
CHEST TIGHTNESS: 0
SHORTNESS OF BREATH: 0

## 2021-08-30 ENCOUNTER — APPOINTMENT (OUTPATIENT)
Dept: GENERAL RADIOLOGY | Age: 56
End: 2021-08-30
Payer: COMMERCIAL

## 2021-08-30 ENCOUNTER — HOSPITAL ENCOUNTER (EMERGENCY)
Age: 56
Discharge: HOME | End: 2021-08-30
Attending: EMERGENCY MEDICINE
Payer: COMMERCIAL

## 2021-08-30 VITALS
WEIGHT: 243 LBS | HEART RATE: 88 BPM | DIASTOLIC BLOOD PRESSURE: 61 MMHG | OXYGEN SATURATION: 94 % | TEMPERATURE: 98.5 F | BODY MASS INDEX: 41.48 KG/M2 | HEIGHT: 64 IN | RESPIRATION RATE: 16 BRPM | SYSTOLIC BLOOD PRESSURE: 133 MMHG

## 2021-08-30 DIAGNOSIS — R06.02 SHORTNESS OF BREATH: Primary | ICD-10-CM

## 2021-08-30 LAB
A/G RATIO: 1.2 (ref 1.1–2.2)
ALBUMIN SERPL-MCNC: 4.3 G/DL (ref 3.4–5)
ALP BLD-CCNC: 115 U/L (ref 40–129)
ALT SERPL-CCNC: 51 U/L (ref 10–40)
ANION GAP SERPL CALCULATED.3IONS-SCNC: 13 MMOL/L (ref 3–16)
AST SERPL-CCNC: 43 U/L (ref 15–37)
BASOPHILS ABSOLUTE: 0.1 K/UL (ref 0–0.2)
BASOPHILS RELATIVE PERCENT: 1.3 %
BILIRUB SERPL-MCNC: 0.4 MG/DL (ref 0–1)
BUN BLDV-MCNC: 9 MG/DL (ref 7–20)
CALCIUM SERPL-MCNC: 9.8 MG/DL (ref 8.3–10.6)
CHLORIDE BLD-SCNC: 98 MMOL/L (ref 99–110)
CO2: 24 MMOL/L (ref 21–32)
CREAT SERPL-MCNC: 0.7 MG/DL (ref 0.6–1.1)
EKG ATRIAL RATE: 91 BPM
EKG DIAGNOSIS: NORMAL
EKG P AXIS: 35 DEGREES
EKG P-R INTERVAL: 160 MS
EKG Q-T INTERVAL: 372 MS
EKG QRS DURATION: 80 MS
EKG QTC CALCULATION (BAZETT): 457 MS
EKG R AXIS: 17 DEGREES
EKG T AXIS: 48 DEGREES
EKG VENTRICULAR RATE: 91 BPM
EOSINOPHILS ABSOLUTE: 0.2 K/UL (ref 0–0.6)
EOSINOPHILS RELATIVE PERCENT: 2.2 %
GFR AFRICAN AMERICAN: >60
GFR NON-AFRICAN AMERICAN: >60
GLOBULIN: 3.6 G/DL
GLUCOSE BLD-MCNC: 206 MG/DL (ref 70–99)
HCT VFR BLD CALC: 40.8 % (ref 36–48)
HEMOGLOBIN: 13.9 G/DL (ref 12–16)
LYMPHOCYTES ABSOLUTE: 1.6 K/UL (ref 1–5.1)
LYMPHOCYTES RELATIVE PERCENT: 17 %
MCH RBC QN AUTO: 31.5 PG (ref 26–34)
MCHC RBC AUTO-ENTMCNC: 34 G/DL (ref 31–36)
MCV RBC AUTO: 92.5 FL (ref 80–100)
MONOCYTES ABSOLUTE: 0.4 K/UL (ref 0–1.3)
MONOCYTES RELATIVE PERCENT: 4.1 %
NEUTROPHILS ABSOLUTE: 7 K/UL (ref 1.7–7.7)
NEUTROPHILS RELATIVE PERCENT: 75.4 %
PDW BLD-RTO: 14 % (ref 12.4–15.4)
PLATELET # BLD: 313 K/UL (ref 135–450)
PMV BLD AUTO: 9 FL (ref 5–10.5)
POTASSIUM REFLEX MAGNESIUM: 4 MMOL/L (ref 3.5–5.1)
PRO-BNP: 25 PG/ML (ref 0–124)
RBC # BLD: 4.41 M/UL (ref 4–5.2)
SARS-COV-2, NAAT: NOT DETECTED
SODIUM BLD-SCNC: 135 MMOL/L (ref 136–145)
TOTAL PROTEIN: 7.9 G/DL (ref 6.4–8.2)
TROPONIN: <0.01 NG/ML
WBC # BLD: 9.3 K/UL (ref 4–11)

## 2021-08-30 PROCEDURE — 84484 ASSAY OF TROPONIN QUANT: CPT

## 2021-08-30 PROCEDURE — 71045 X-RAY EXAM CHEST 1 VIEW: CPT

## 2021-08-30 PROCEDURE — 93010 ELECTROCARDIOGRAM REPORT: CPT | Performed by: INTERNAL MEDICINE

## 2021-08-30 PROCEDURE — 99283 EMERGENCY DEPT VISIT LOW MDM: CPT

## 2021-08-30 PROCEDURE — 83880 ASSAY OF NATRIURETIC PEPTIDE: CPT

## 2021-08-30 PROCEDURE — 80053 COMPREHEN METABOLIC PANEL: CPT

## 2021-08-30 PROCEDURE — 87635 SARS-COV-2 COVID-19 AMP PRB: CPT

## 2021-08-30 PROCEDURE — 85025 COMPLETE CBC W/AUTO DIFF WBC: CPT

## 2021-08-30 PROCEDURE — 93005 ELECTROCARDIOGRAM TRACING: CPT | Performed by: EMERGENCY MEDICINE

## 2021-08-30 NOTE — ED PROVIDER NOTES
[address]  EMERGENCY DEPARTMENT ENCOUNTER      Pt Name: Rowan Palomo  MRN: [id number]  [place] 1965  Date of evaluation: 8/30/2021  Provider: Justin Galeas MD    CHIEF COMPLAINT       Chief Complaint   Patient presents with    Pneumonia     states dx with pneumonia and sats low tonight, but after walking back to room from lobby, O2 sat was 94%. lives with parents, dad dx with COVID on 8/5, and pt dx with pneumonia on 8/5         HISTORY OF PRESENT ILLNESS   (Location/Symptom, Timing/Onset, Context/Setting, Quality, Duration, Modifying Factors, Severity)  Note limiting factors. Rowan Palomo is a 63 y.o. female with past medical history of hypertension, migraine headaches, asthma here today with reported pneumonia    The patient states this earlier this month on 8/5/2021 she was diagnosed with pneumonia. She states she was taking care of both of her parents who were both diagnosed with COVID-19 when she began to have respiratory symptoms and went to her primary care physician had a chest x-ray concerning for an infiltrate. She did not however have a COVID-19 test and has not been vaccinated. She notes she was having some shortness of breath and cough throughout that time and did have a repeat chest x-ray showing new infiltrates. She is completed 2 courses of antibiotics. She notes she still has an occasional cough and feels occasionally weak and tired but has no chest pain. She reports no fevers at this time. She states she is just finishing up her last round of antibiotics. She notes she checks her oxygen levels at home and states that they are primarily in the mid 90s, but she has seen it dropped down to 89% for \"just a few seconds\". HPI    Nursing Notes were reviewed. REVIEW OF SYSTEMS    (2-9 systems for level 4, 10 or more for level 5)     Review of Systems    Please see HPI for pertinent positive and negative review of system findings.  A full 10 system ROS was performed and otherwise negative. PAST MEDICAL HISTORY     Past Medical History:   Diagnosis Date    Asthma     Cholelithiasis     Dizziness     Hypertension     Migraine aura, persistent     Nausea & vomiting          SURGICAL HISTORY       Past Surgical History:   Procedure Laterality Date    CHOLECYSTECTOMY      CHOLECYSTECTOMY      ENDOMETRIAL ABLATION      KNEE ARTHROSCOPY Right 12/3/13    VAS partial menisectomy partial lateral meniscectomy, chondroplasty synovectomy    TONSILLECTOMY      WRIST SURGERY  1/13    rt wrist         CURRENT MEDICATIONS       Previous Medications    ACETAMINOPHEN (TYLENOL 8 HOUR ARTHRITIS PAIN PO)        ALBUTEROL SULFATE  (90 BASE) MCG/ACT INHALER    Inhale 2 puffs into the lungs every 6 hours as needed for Wheezing    AMOXICILLIN (AMOXIL) 500 MG CAPSULE    Take 2 capsules by mouth 3 times daily for 7 days    BLOOD PRESSURE KIT    1 kit by Does not apply route 2 times daily    DOXYCYCLINE HYCLATE (VIBRA-TABS) 100 MG TABLET    Take 1 tablet by mouth 2 times daily for 7 days    HYDROCHLOROTHIAZIDE (HYDRODIURIL) 25 MG TABLET    Take 1 tablet by mouth every morning    MULTIPLE VITAMINS-MINERALS (MULTIVITAMIN PO)    Take by mouth    OMEPRAZOLE (PRILOSEC) 10 MG DELAYED RELEASE CAPSULE    Take 20 mg by mouth daily       ALLERGIES     Clarithromycin, Adhesive tape, and Sulfa antibiotics    FAMILY HISTORY     History reviewed. No pertinent family history.        SOCIAL HISTORY       Social History     Socioeconomic History    Marital status: Single     Spouse name: None    Number of children: None    Years of education: None    Highest education level: None   Occupational History    None   Tobacco Use    Smoking status: Never Smoker    Smokeless tobacco: Never Used   Vaping Use    Vaping Use: Never used   Substance and Sexual Activity    Alcohol use: No    Drug use: No    Sexual activity: Yes     Partners: Male   Other Topics Concern    None   Social History Narrative    None     Social Determinants of Health     Financial Resource Strain: Low Risk     Difficulty of Paying Living Expenses: Not hard at all   Food Insecurity: No Food Insecurity    Worried About Running Out of Food in the Last Year: Never true    Tiffani of Food in the Last Year: Never true   Transportation Needs: No Transportation Needs    Lack of Transportation (Medical): No    Lack of Transportation (Non-Medical): No   Physical Activity:     Days of Exercise per Week:     Minutes of Exercise per Session:    Stress:     Feeling of Stress :    Social Connections:     Frequency of Communication with Friends and Family:     Frequency of Social Gatherings with Friends and Family:     Attends Yazidism Services:     Active Member of Clubs or Organizations:     Attends Club or Organization Meetings:     Marital Status:    Intimate Partner Violence:     Fear of Current or Ex-Partner:     Emotionally Abused:     Physically Abused:     Sexually Abused:        SCREENINGS               PHYSICAL EXAM    (up to 7 for level 4, 8 or more for level 5)     ED Triage Vitals [08/30/21 0205]   BP Temp Temp Source Pulse Resp SpO2 Height Weight   (!) 146/75 98.5 °F (36.9 °C) Oral 90 18 97 % 5' 3.5\" (1.613 m) 243 lb (110.2 kg)       Physical Exam    General appearance:  Cooperative. No acute distress. Skin:  Warm. Dry. Eye:  Extraocular movements intact. Ears, nose, mouth and throat:  Oral mucosa moist,  Neck:  Trachea midline. Heart:  Regular rate and rhythm  Perfusion:  intact  Respiratory:  Lungs clear to auscultation bilaterally. Respirations nonlabored. Abdominal:   Non distended. Nontender  Neurological:  Alert and oriented x 3.   Moves all extremities spontaneously  Musculoskeletal:   Normal ROM, no deformities          Psychiatric:  Normal mood      DIAGNOSTIC RESULTS       Labs Reviewed   COMPREHENSIVE METABOLIC PANEL W/ REFLEX TO MG FOR LOW K - Abnormal; Notable for the following components:       Result Value    Sodium 135 (*)     Chloride 98 (*)     Glucose 206 (*)     ALT 51 (*)     AST 43 (*)     All other components within normal limits    Narrative:     Performed at:  [facility],  [place], [employer]   Phone [phone], RAPID    Narrative:     Performed at:  [place] [address],  [place], [facility]   Phone [phone]   CBC WITH AUTO DIFFERENTIAL    Narrative:     Performed at:  [place] [address],  [place], [facility]   Phone [phone]   TROPONIN    Narrative:     Performed at:  [place] [address],  [place], [facility]   Phone [phone]   BRAIN NATRIURETIC PEPTIDE    Narrative:     Performed at:  [facility]) [facility] [address],  [place], [place]   Phone [phone]       Interpretation per the Radiologist below, if obtained/available at the time of this note:    XR CHEST PORTABLE   Final Result   No radiographic evidence of acute pulmonary disease. All other labs/imaging were within normal range or not returned as of this dictation. EMERGENCY DEPARTMENT COURSE and DIFFERENTIAL DIAGNOSIS/MDM:   Vitals:    Vitals:    08/30/21 0205   BP: (!) 146/75   Pulse: 90   Resp: 18   Temp: 98.5 °F (36.9 °C)   TempSrc: Oral   SpO2: 97%   Weight: 243 lb (110.2 kg)   Height: 5' 3.5\" (1.613 m)       EKG: Sinus rhythm rate of 91 bpm.  No ST elevation or arrhythmia. Patient presents the emergency department today complaining of continued cough and shortness of breath. She was being treated as an outpatient for 3 weeks for presumed pneumonia, when in fact she likely had COVID-19. Having no significant symptoms at present. She is not hypoxic.   She has been able to ambulate throughout the emergency department and never dropped her oxygen

## 2021-08-31 ENCOUNTER — OFFICE VISIT (OUTPATIENT)
Dept: FAMILY MEDICINE CLINIC | Age: 56
End: 2021-08-31
Payer: COMMERCIAL

## 2021-08-31 VITALS
BODY MASS INDEX: 42.27 KG/M2 | OXYGEN SATURATION: 97 % | DIASTOLIC BLOOD PRESSURE: 80 MMHG | WEIGHT: 242.4 LBS | SYSTOLIC BLOOD PRESSURE: 120 MMHG | HEART RATE: 93 BPM | TEMPERATURE: 99.3 F

## 2021-08-31 DIAGNOSIS — R05.9 COUGH: ICD-10-CM

## 2021-08-31 DIAGNOSIS — K21.9 GASTROESOPHAGEAL REFLUX DISEASE, UNSPECIFIED WHETHER ESOPHAGITIS PRESENT: Primary | ICD-10-CM

## 2021-08-31 PROCEDURE — 99213 OFFICE O/P EST LOW 20 MIN: CPT | Performed by: NURSE PRACTITIONER

## 2021-08-31 RX ORDER — OMEPRAZOLE 40 MG/1
40 CAPSULE, DELAYED RELEASE ORAL DAILY
Qty: 30 CAPSULE | Refills: 1 | Status: SHIPPED | OUTPATIENT
Start: 2021-08-31 | End: 2021-10-13 | Stop reason: SDUPTHER

## 2021-08-31 ASSESSMENT — ENCOUNTER SYMPTOMS
CHOKING: 0
GASTROINTESTINAL NEGATIVE: 1
SHORTNESS OF BREATH: 0
STRIDOR: 0
APNEA: 0
WHEEZING: 0
COUGH: 1
CHEST TIGHTNESS: 0

## 2021-08-31 NOTE — PATIENT INSTRUCTIONS
people. If your symptoms are worse after you eat a certain food, you may want to stop eating that food to see if your symptoms get better. · Do not smoke or chew tobacco. Smoking can make GERD worse. If you need help quitting, talk to your doctor about stop-smoking programs and medicines. These can increase your chances of quitting for good. · If you have GERD symptoms at night, raise the head of your bed 6 to 8 inches by putting the frame on blocks or placing a foam wedge under the head of your mattress. (Adding extra pillows does not work.)  · Do not wear tight clothing around your middle. · Lose weight if you need to. Losing just 5 to 10 pounds can help. When should you call for help? Call your doctor now or seek immediate medical care if:    · You have new or different belly pain.     · Your stools are black and tarlike or have streaks of blood. Watch closely for changes in your health, and be sure to contact your doctor if:    · Your symptoms have not improved after 2 days.     · Food seems to catch in your throat or chest.   Where can you learn more? Go to [email or web address] and sign in to your [employer] account. Enter N603 in the [facility] box to learn more about \"Gastroesophageal Reflux Disease (GERD): Care Instructions. \"     If you do not have an account, please click on the \"Sign Up Now\" link. Current as of: February 10, 2021               Content Version: 12.9  © 2006-2021 [employer], [facility]. Care instructions adapted under license by [facility] ([facility]). If you have questions about a medical condition or this instruction, always ask your healthcare professional. [place] [address] any warranty or liability for your use of this information.

## 2021-08-31 NOTE — PROGRESS NOTES
2021     Antonio De Souza (:  1965) is a 63 y.o. female, here for evaluation of the following medical concerns:    HPI  I saw this pt 21 for pneumonia. She was seen in the ER yesterday for SOB and cough. Her chest xray was normal, pneumonia has resolved. She states that her acid reflux has worsened a lot over the past few weeks, to the point where bile comes up in her mouth. Taking OTC omeprazole as directed without relief of symptoms. Denies black, tarry or bloody stools. Denies unexplained weight loss. States that she feels like something is stuck in the right side of her throat, denies trouble swallowing solids or liquids. Review of Systems   HENT: Negative. Respiratory: Positive for cough. Negative for apnea, choking, chest tightness, shortness of breath, wheezing and stridor. Cardiovascular: Negative. Gastrointestinal: Negative. C/o acid reflux     Skin: Negative. Neurological: Negative. Prior to Visit Medications    Medication Sig Taking?  Authorizing Provider   omeprazole (PRILOSEC) 40 MG delayed release capsule Take 1 capsule by mouth daily Yes MARIAH Saldana CNP   amoxicillin (AMOXIL) 500 MG capsule Take 2 capsules by mouth 3 times daily for 7 days Yes MARIAH Saldana CNP   doxycycline hyclate (VIBRA-TABS) 100 MG tablet Take 1 tablet by mouth 2 times daily for 7 days Yes MARIAH Saldana CNP   albuterol sulfate  (90 Base) MCG/ACT inhaler Inhale 2 puffs into the lungs every 6 hours as needed for Wheezing Yes MARIAH Saldana CNP   hydroCHLOROthiazide (HYDRODIURIL) 25 MG tablet Take 1 tablet by mouth every morning Yes Dalton Mcqueen, MARIAH Mccarthy CNP   Blood Pressure KIT 1 kit by Does not apply route 2 times daily Yes MARIAH Saldana CNP   Acetaminophen (TYLENOL 8 HOUR ARTHRITIS PAIN PO)  Yes Historical Provider, MD   Multiple Vitamins-Minerals (MULTIVITAMIN PO) Take by mouth  Historical Provider, MD        Social History Tobacco Use    Smoking status: Never Smoker    Smokeless tobacco: Never Used   Substance Use Topics    Alcohol use: No        Vitals:    08/31/21 1108   BP: 120/80   Site: Left Upper Arm   Position: Sitting   Cuff Size: Large Adult   Pulse: 93   Temp: 99.3 °F (37.4 °C)   TempSrc: Oral   SpO2: 97%   Weight: 242 lb 6.4 oz (110 kg)     Estimated body mass index is 42.27 kg/m² as calculated from the following:    Height as of 8/30/21: 5' 3.5\" (1.613 m). Weight as of this encounter: 242 lb 6.4 oz (110 kg). Physical Exam  Vitals reviewed. HENT:      Head: Normocephalic. Cardiovascular:      Rate and Rhythm: Normal rate and regular rhythm. Heart sounds: Normal heart sounds. Pulmonary:      Effort: Pulmonary effort is normal.      Breath sounds: Normal breath sounds. Abdominal:      General: Abdomen is flat. Bowel sounds are normal. There is no distension. Palpations: Abdomen is soft. Tenderness: There is no abdominal tenderness. Musculoskeletal:      Cervical back: Normal range of motion. Skin:     General: Skin is warm and dry. Capillary Refill: Capillary refill takes less than 2 seconds. Neurological:      Mental Status: She is alert and oriented to person, place, and time. Psychiatric:         Mood and Affect: Mood normal.         Behavior: Behavior normal.         Thought Content: Thought content normal.         Judgment: Judgment normal.         ASSESSMENT/PLAN:  1. Cough  See HPI. Pneumonia has resolved. Continued cough likely d/t acid reflux. 2. Gastroesophageal reflux disease, unspecified whether esophagitis present  Advised pt to take omeprazole as directed and f/u with Dr. Cezar Starks for possible EGD. - omeprazole (PRILOSEC) 40 MG delayed release capsule; Take 1 capsule by mouth daily  Dispense: 30 capsule; Refill: 1  - External Referral To Gastroenterology      Return if symptoms worsen or fail to improve.     An electronic signature was used to authenticate this note.     --MARIAH Ham - CNP on 8/31/2021 at 12:28 PM

## 2021-10-13 ENCOUNTER — OFFICE VISIT (OUTPATIENT)
Dept: FAMILY MEDICINE CLINIC | Age: 56
End: 2021-10-13
Payer: COMMERCIAL

## 2021-10-13 VITALS — DIASTOLIC BLOOD PRESSURE: 76 MMHG | BODY MASS INDEX: 40.73 KG/M2 | SYSTOLIC BLOOD PRESSURE: 138 MMHG | WEIGHT: 233.6 LBS

## 2021-10-13 DIAGNOSIS — R74.8 ELEVATED LIVER ENZYMES: ICD-10-CM

## 2021-10-13 DIAGNOSIS — R79.89 ELEVATED TSH: ICD-10-CM

## 2021-10-13 DIAGNOSIS — I10 HYPERTENSION, UNSPECIFIED TYPE: ICD-10-CM

## 2021-10-13 DIAGNOSIS — Z12.31 ENCOUNTER FOR SCREENING MAMMOGRAM FOR MALIGNANT NEOPLASM OF BREAST: ICD-10-CM

## 2021-10-13 DIAGNOSIS — E78.2 MIXED HYPERLIPIDEMIA: ICD-10-CM

## 2021-10-13 DIAGNOSIS — Z12.11 COLON CANCER SCREENING: ICD-10-CM

## 2021-10-13 DIAGNOSIS — R73.03 PREDIABETES: Primary | ICD-10-CM

## 2021-10-13 DIAGNOSIS — K21.9 GASTROESOPHAGEAL REFLUX DISEASE, UNSPECIFIED WHETHER ESOPHAGITIS PRESENT: ICD-10-CM

## 2021-10-13 PROCEDURE — 99214 OFFICE O/P EST MOD 30 MIN: CPT | Performed by: NURSE PRACTITIONER

## 2021-10-13 RX ORDER — FERROUS SULFATE 325(65) MG
TABLET ORAL
COMMUNITY
Start: 2021-10-01

## 2021-10-13 RX ORDER — ATORVASTATIN CALCIUM 20 MG/1
20 TABLET, FILM COATED ORAL DAILY
Qty: 90 TABLET | Refills: 1 | Status: SHIPPED | OUTPATIENT
Start: 2021-10-13

## 2021-10-13 RX ORDER — OMEPRAZOLE 40 MG/1
40 CAPSULE, DELAYED RELEASE ORAL DAILY
Qty: 30 CAPSULE | Refills: 1 | Status: SHIPPED | OUTPATIENT
Start: 2021-10-13 | End: 2021-12-07 | Stop reason: SDUPTHER

## 2021-10-13 RX ORDER — DEXAMETHASONE 4 MG/1
TABLET ORAL
COMMUNITY
Start: 2021-10-05

## 2021-10-13 ASSESSMENT — ENCOUNTER SYMPTOMS
EYES NEGATIVE: 1
RESPIRATORY NEGATIVE: 1
GASTROINTESTINAL NEGATIVE: 1

## 2021-10-13 NOTE — PROGRESS NOTES
10/13/2021    Jamal Fairchild (:  1965) is a 61 y.o. female, here for a preventive medicine evaluation. Pt is here for 3 month f/u for diabetes, elevated TSH and HLD. Pt has lost 30+ lbs in the past 7 months with diet. She has starting walking, goal is to get up to 5 miles per day. Repeat labs done 10/11/2021. A1C is down to 5.9. Was 7.2 four months ago. TSH was 5.83 2021, it was down to 4.96 10/11/21. Pt does have chronic hair thinning, dry skin, fatigue and heat intolerance. Pt states that \"everyone in her family has hair thinning and dry skin. \"  Pt would like to hold off on starting medication for now. TC is down to 302, triglycerides are down to 180 and LDL is down to 217. Pt had declined starting statin therapy. The 10-year ASCVD risk score (Natalie Thornton, et al., 2013) is: 4.5%    Values used to calculate the score:      Age: 61 years      Sex: Female      Is Non- : No      Diabetic: No      Tobacco smoker: No      Systolic Blood Pressure: 519 mmHg      Is BP treated: No      HDL Cholesterol: 49 mg/dL      Total Cholesterol: 302 mg/dL         Patient Active Problem List   Diagnosis    Lateral meniscus tear    Localized osteoarthrosis, lower leg    Other viral warts    Essential tremor    Adiposity    Plantar fasciitis, right    Sprain of right ankle    Right ankle pain    Ankle pain    Plantar fasciitis, left    Hypertension       Review of Systems   Constitutional: Negative. HENT: Negative. Eyes: Negative. Respiratory: Negative. Cardiovascular: Negative. Gastrointestinal: Negative. Endocrine: Positive for heat intolerance. Negative for cold intolerance, polydipsia, polyphagia and polyuria. Genitourinary: Negative. Musculoskeletal: Negative. Skin: Negative. Neurological: Negative. Psychiatric/Behavioral: Negative. Prior to Visit Medications    Medication Sig Taking?  Authorizing Provider vitamin D (VITAMIN D3 MAXIMUM STRENGTH) 125 MCG (5000 UT) CAPS capsule  Yes Historical Provider, MD   FLOVENT  MCG/ACT inhaler  Yes Historical Provider, MD   omeprazole (PRILOSEC) 40 MG delayed release capsule Take 1 capsule by mouth daily Yes MARIAH Jernigan CNP   Blood Pressure KIT 1 kit by Does not apply route 2 times daily Yes MARIAH Jernigan CNP   Multiple Vitamins-Minerals (MULTIVITAMIN PO) Take by mouth  Historical Provider, MD        Allergies   Allergen Reactions    Adhesive Tape Rash    Clarithromycin Hives and Rash    Sulfa Antibiotics Hives, Itching and Rash       Past Medical History:   Diagnosis Date    Asthma     Cholelithiasis     Dizziness     Hypertension     Migraine aura, persistent     Nausea & vomiting        Past Surgical History:   Procedure Laterality Date    CHOLECYSTECTOMY      CHOLECYSTECTOMY      ENDOMETRIAL ABLATION      KNEE ARTHROSCOPY Right 12/3/13    VAS partial menisectomy partial lateral meniscectomy, chondroplasty synovectomy    TONSILLECTOMY      WRIST SURGERY  1/13    rt wrist       Social History     Socioeconomic History    Marital status: Single     Spouse name: Not on file    Number of children: Not on file    Years of education: Not on file    Highest education level: Not on file   Occupational History    Not on file   Tobacco Use    Smoking status: Never Smoker    Smokeless tobacco: Never Used   Vaping Use    Vaping Use: Never used   Substance and Sexual Activity    Alcohol use: No    Drug use: No    Sexual activity: Yes     Partners: Male   Other Topics Concern    Not on file   Social History Narrative    Not on file     Social Determinants of Health     Financial Resource Strain: Low Risk     Difficulty of Paying Living Expenses: Not hard at all   Food Insecurity: No Food Insecurity    Worried About Running Out of Food in the Last Year: Never true    Tiffani of Food in the Last Year: Never true   Transportation Judgment: Judgment normal.         No flowsheet data found. Lab Results   Component Value Date    CHOL 302 10/11/2021    CHOL 326 05/28/2021    TRIG 180 10/11/2021    TRIG 244 05/28/2021    HDL 49 10/11/2021    HDL 48 05/28/2021    LDLCALC 217 10/11/2021    LDLCALC 229 05/28/2021    GLUCOSE 206 08/30/2021    LABA1C 5.9 10/11/2021    LABA1C 7.2 06/03/2021    LABA1C 6.9 05/28/2021       The 10-year ASCVD risk score (Elton العراقي et al., 2013) is: 4.5%    Values used to calculate the score:      Age: 61 years      Sex: Female      Is Non- : No      Diabetic: No      Tobacco smoker: No      Systolic Blood Pressure: 182 mmHg      Is BP treated: No      HDL Cholesterol: 49 mg/dL      Total Cholesterol: 302 mg/dL      There is no immunization history on file for this patient. Health Maintenance   Topic Date Due    COVID-19 Vaccine (1) Never done    Cervical cancer screen  Never done    Colon cancer screen colonoscopy  Never done    Breast cancer screen  Never done    Shingles Vaccine (1 of 2) Never done    Flu vaccine (1) 09/01/2021    A1C test (Diabetic or Prediabetic)  10/11/2022    DTaP/Tdap/Td vaccine (2 - Td or Tdap) 03/23/2025    Lipid screen  10/11/2026    Pneumococcal 0-64 years Vaccine (2 of 2 - PPSV23) 09/28/2029    Hepatitis C screen  Completed    HIV screen  Completed    Hepatitis A vaccine  Aged Out    Hepatitis B vaccine  Aged Out    Hib vaccine  Aged Out    Meningococcal (ACWY) vaccine  Aged Out          ASSESSMENT/PLAN:    1. Prediabetes  A1C is down to 5. 9! Congratulated pt on weight loss. She plans to continue following healthy diet and is going to start walking daily. Her goal is to get up to 5 miles per day. Will recheck A1C in 3 months. -     Comprehensive Metabolic Panel; Future  -     Hemoglobin A1C; Future    2. Mixed hyperlipidemia  TC, triglycerides and LDL have improved with recent weight loss, but are still elevated.   Pt is still very reluctant to start statin therapy. I discussed my concern with the pt's LDL being 217 and that she is at a much higher risk of having a heart attack or stroke. I will send in a prescription for atorvastatin 20 mg daily. Pt states that she will \"think about starting it\". Pt will f/u with me regarding her plan. -     Lipid Panel; Future  -     Atorvastatin 20 mg daily     3. Elevated TSH  TSH has decreased since last lab check in 5/2021. Pt declines starting medication at this time. Would like to recheck at next 3 month follow up. Possible correlation with HLD and hypothyroidism?  -     T4, Free; Future  -     TSH without Reflex; Future    4. Hypertension, unspecified type  BP is stable today. Pt states that she has been monitoring it at home and it has been normal.  She dc'd her HCTZ. Advised pt to continue to monitor BP at home. -     Comprehensive Metabolic Panel; Future    5. Colon cancer screening  -     [facility]    6. Encounter for screening mammogram for malignant neoplasm of breast  Reprinted mammogram order. Pt will go to our breast center today to schedule. 7. Elevated liver enzymes  Likely d/t HLD. Will check at 3 month f/u. -     Comprehensive Metabolic Panel; Future    8. Gastroesophageal reflux disease, unspecified whether esophagitis present  Continue f/u with GI.    -     omeprazole (PRILOSEC) 40 MG delayed release capsule; Take 1 capsule by mouth daily, Disp-30 capsule, R-1Normal      Return in about 3 months (around 1/13/2022) for Prediabetes, elevated TSH, elevated liver enzymes, HLD and HTN. An electronic signature was used to authenticate this note.     --MARIAH Gardiner - CNP on 10/13/2021 at 8:50 AM

## 2021-10-22 ENCOUNTER — PATIENT MESSAGE (OUTPATIENT)
Dept: FAMILY MEDICINE CLINIC | Age: 56
End: 2021-10-22

## 2021-11-15 ENCOUNTER — HOSPITAL ENCOUNTER (OUTPATIENT)
Dept: VASCULAR LAB | Age: 56
Discharge: HOME | End: 2021-11-15
Payer: COMMERCIAL

## 2021-11-15 DIAGNOSIS — R22.41 LOCALIZED SWELLING, MASS AND LUMP, LOWER LIMB, RIGHT: ICD-10-CM

## 2021-11-15 DIAGNOSIS — M79.661 RIGHT CALF PAIN: ICD-10-CM

## 2021-11-15 PROCEDURE — 93971 EXTREMITY STUDY: CPT

## 2021-12-02 NOTE — TELEPHONE ENCOUNTER
LMOM for a call back. Pt is due for a mammogram. If she has had this completed please let me know where so I can get the result.  If she has not had this completed myself or Dary Jones can help her schedule.

## 2021-12-06 ENCOUNTER — PATIENT MESSAGE (OUTPATIENT)
Dept: FAMILY MEDICINE CLINIC | Age: 56
End: 2021-12-06

## 2021-12-06 DIAGNOSIS — J30.9 ALLERGIC RHINITIS, UNSPECIFIED SEASONALITY, UNSPECIFIED TRIGGER: Primary | ICD-10-CM

## 2021-12-07 DIAGNOSIS — K21.9 GASTROESOPHAGEAL REFLUX DISEASE, UNSPECIFIED WHETHER ESOPHAGITIS PRESENT: ICD-10-CM

## 2021-12-07 RX ORDER — OMEPRAZOLE 40 MG/1
40 CAPSULE, DELAYED RELEASE ORAL DAILY
Qty: 90 CAPSULE | Refills: 1 | Status: SHIPPED | OUTPATIENT
Start: 2021-12-07

## 2021-12-07 NOTE — TELEPHONE ENCOUNTER
From: Mumtaz Mccloud  To: Tab Flores  Sent: 12/6/2021 9:10 PM EST  Subject: Omeprozole 40 mg Refill    Dr Sacha Patton told me to keep taking omeprazole 40 mg so I submitted this to [facility] for a 90 day supply. They may need an order for that. He also said I may have a food allergy causing the problem I have in my esophagus.  I will need to see an allergist.    Wayne Carpenter

## 2021-12-22 ENCOUNTER — HOSPITAL ENCOUNTER (OUTPATIENT)
Dept: WOMENS IMAGING | Age: 56
Discharge: HOME | End: 2021-12-22
Payer: COMMERCIAL

## 2021-12-22 DIAGNOSIS — Z12.31 ENCOUNTER FOR SCREENING MAMMOGRAM FOR MALIGNANT NEOPLASM OF BREAST: ICD-10-CM

## 2021-12-22 PROCEDURE — 77067 SCR MAMMO BI INCL CAD: CPT

## 2022-05-27 ENCOUNTER — TELEMEDICINE (OUTPATIENT)
Dept: PRIMARY CARE CLINIC | Age: 57
End: 2022-05-27
Payer: COMMERCIAL

## 2022-05-27 DIAGNOSIS — J40 BRONCHITIS: Primary | ICD-10-CM

## 2022-05-27 PROCEDURE — 99422 OL DIG E/M SVC 11-20 MIN: CPT | Performed by: NURSE PRACTITIONER

## 2022-05-27 RX ORDER — ALBUTEROL SULFATE 90 UG/1
2 AEROSOL, METERED RESPIRATORY (INHALATION) EVERY 6 HOURS PRN
Qty: 18 G | Refills: 3 | Status: SHIPPED | OUTPATIENT
Start: 2022-05-27

## 2022-05-27 RX ORDER — METHYLPREDNISOLONE 4 MG/1
TABLET ORAL
Qty: 1 KIT | Refills: 0 | Status: SHIPPED | OUTPATIENT
Start: 2022-05-27 | End: 2022-06-02

## 2022-05-27 ASSESSMENT — ENCOUNTER SYMPTOMS
SINUS PRESSURE: 1
SORE THROAT: 1
COUGH: 1

## 2022-05-27 NOTE — PROGRESS NOTES
Ghulam Zheng (:  1965) is a Established patient, here for evaluation of the following:    ASSESSMENT/PLAN:  Below is the assessment and plan developed based on review of pertinent history, physical exam, labs, studies, and medications. 1. Bronchitis  Add zyrtec, mucinex, cough drops, hot tea and honey  -     albuterol sulfate HFA (PROVENTIL HFA) 108 (90 Base) MCG/ACT inhaler; Inhale 2 puffs into the lungs every 6 hours as needed for Wheezing, Disp-18 g, R-3Normal  -     methylPREDNISolone (MEDROL DOSEPACK) 4 MG tablet; Take by mouth., Disp-1 kit, R-0Normal    Call PCP if not improved or worsening in 1 week    SUBJECTIVE/OBJECTIVE:  Her symptoms started last week. She went to [facility] and was given antibiotics and steroids. After the 5 days the cough returned. It improved during the course but has worsened since medication was finished. She has tried mucinex DM. Will do a lower dose longer course medrol dose pack, she can add zyrtec, an inhaler, and mucinex. Please send my chart message over weekend if not improved. Review of Systems   HENT: Positive for ear pain, postnasal drip, sinus pressure and sore throat. Ears feel full, clear   Respiratory: Positive for cough. Feels like something is stuck and cant get up. Neurological: Positive for headaches.        Patient-Reported Vitals 2022   Patient-Reported Weight 230   Patient-Reported Height 53   Patient-Reported SpO2 97         Physical Exam    [INSTRUCTIONS:  \"[x]\" Indicates a positive item  \"[]\" Indicates a negative item  -- DELETE ALL ITEMS NOT EXAMINED]    Constitutional: [x] Appears well-developed and well-nourished [x] No apparent distress      [] Abnormal -     Mental status: [x] Alert and awake  [x] Oriented to person/place/time [x] Able to follow commands    [] Abnormal -     Eyes:   EOM    [x]  Normal    [] Abnormal -   Sclera  [x]  Normal    [] Abnormal -          Discharge [x]  None visible   [] Abnormal -     HENT: [x] Normocephalic, atraumatic  [] Abnormal -   [x] Mouth/Throat: Mucous membranes are moist    External Ears [x] Normal  [] Abnormal -    Neck: [x] No visualized mass [] Abnormal -     Pulmonary/Chest: [x] Respiratory effort normal   [x] No visualized signs of difficulty breathing or respiratory distress        [] Abnormal -      Musculoskeletal:   [x] Normal gait with no signs of ataxia         [x] Normal range of motion of neck        [] Abnormal -     Neurological:        [x] No Facial Asymmetry (Cranial nerve 7 motor function) (limited exam due to video visit)          [x] No gaze palsy        [] Abnormal -          Skin:        [x] No significant exanthematous lesions or discoloration noted on facial skin         [] Abnormal -            Psychiatric:       [x] Normal Affect [] Abnormal -        [x] No Hallucinations    Other pertinent observable physical exam findings:-      On this date 5/27/2022 I have spent 20 minutes reviewing previous notes, test results and face to face (virtual) with the patient discussing the diagnosis and importance of compliance with the treatment plan as well as documenting on the day of the visit. Yari Smith, was evaluated through a synchronous (real-time) audio-video encounter. The patient (or guardian if applicable) is aware that this is a billable service, which includes applicable co-pays. This Virtual Visit was conducted with patient's (and/or legal guardian's) consent. The visit was conducted pursuant to the emergency declaration under the [address] [place] [address], [address] [place] [address] authority and the [employer] Act. Patient identification was verified, and a caregiver was present when appropriate. The patient was located at home in a state where the provider was licensed to provide care.     --Enedelia Vee, APRN

## 2022-05-29 DIAGNOSIS — J20.9 ACUTE BRONCHITIS, UNSPECIFIED ORGANISM: Primary | ICD-10-CM

## 2022-05-29 DIAGNOSIS — R05.9 COUGH: ICD-10-CM

## 2022-05-29 RX ORDER — CEFUROXIME AXETIL 250 MG/1
250 TABLET ORAL 2 TIMES DAILY
Qty: 14 TABLET | Refills: 0 | Status: SHIPPED | OUTPATIENT
Start: 2022-05-29 | End: 2022-06-05

## 2022-05-29 RX ORDER — DEXTROMETHORPHAN HYDROBROMIDE AND PROMETHAZINE HYDROCHLORIDE 15; 6.25 MG/5ML; MG/5ML
5 SYRUP ORAL 4 TIMES DAILY PRN
Qty: 180 ML | Refills: 0 | Status: SHIPPED | OUTPATIENT
Start: 2022-05-29 | End: 2022-06-05

## 2022-08-16 NOTE — PROGRESS NOTES
2021    TELEHEALTH EVALUATION -- Audio/Visual (During IMKKX-37 public health emergency)    HPI:    Antoinette Ramirez (:  1965) has requested an audio/video evaluation for the following concern(s):    I saw Spike in the [facility]. Her chest xray showed right upper lobe pneumonia and she was treated with amoxicillin 1,000 mg TID for 7 days, doxycycline 100 mg BID for 7 days, oral steroids and cough medication. The Radiologist recommended a f/u chest xray in 2-4 weeks. Pt had the chest xray done yesterday and it showed: FINDINGS:  Heart size and pulmonary vasculature within normal limits. Interval  resolution of right upper lobe infiltrate. New focal opacity in the right  lower lobe. Left lung clear. Costophrenic angles sharp  IMPRESSION:  1. Interval resolution of right upper lobe pneumonia  2. Suspect new infiltrate in the right lower lobe    Pt states that she took the antibiotics as directed and feels better. Her only c/o is a \"nagging\" cough. States that the phenergan DM cough syrup, tessalon perles and OTC cough medications have not helped. Denies SOB or wheezing. States that her O2 sat has been around 98% on room air and has not had to use the inhaler. Review of Systems   Constitutional: Negative. HENT: Negative. Respiratory: Positive for cough. Negative for apnea, choking, chest tightness, shortness of breath, wheezing and stridor. Cardiovascular: Negative. Gastrointestinal: Negative. Neurological: Negative. Prior to Visit Medications    Medication Sig Taking? Authorizing Provider   promethazine-codeine (PHENERGAN WITH CODEINE) 6.25-10 MG/5ML syrup Take 5 mLs by mouth every 4 hours as needed for Cough for up to 3 days.  Yes MARIAH Barone CNP   amoxicillin (AMOXIL) 500 MG capsule Take 2 capsules by mouth 3 times daily for 7 days Yes MARIAH Barone CNP   doxycycline hyclate (VIBRA-TABS) 100 MG tablet Take 1 tablet by mouth 2 times daily for 7 Patient with bilateral upper and lower eyelid erythema with eye injection.  Likely allergic reaction to neomycin.  Will stop the maxitrol ointment and drops and switch to tobradex drops as prescribed by Dr. Tolentino.  Prescription sent.   days Yes Kathy Woodsonjah, APRN - CNP   albuterol sulfate  (90 Base) MCG/ACT inhaler Inhale 2 puffs into the lungs every 6 hours as needed for Wheezing  Kathy Kandicejah, APRN - CNP   hydroCHLOROthiazide (HYDRODIURIL) 25 MG tablet Take 1 tablet by mouth every morning  Ian Bruner, APRN - CNP   Blood Pressure KIT 1 kit by Does not apply route 2 times daily  Kathy Kandicejah, APRN - CNP   Acetaminophen (TYLENOL 8 HOUR ARTHRITIS PAIN PO)   Historical Provider, MD   omeprazole (PRILOSEC) 10 MG delayed release capsule Take 20 mg by mouth daily  Historical Provider, MD   Multiple Vitamins-Minerals (MULTIVITAMIN PO) Take by mouth  Historical Provider, MD       Social History     Tobacco Use    Smoking status: Never Smoker    Smokeless tobacco: Never Used   Vaping Use    Vaping Use: Never used   Substance Use Topics    Alcohol use: No    Drug use: No        Allergies   Allergen Reactions    Clarithromycin Hives and Rash    Adhesive Tape Rash    Sulfa Antibiotics Hives, Itching and Rash   ,   Past Medical History:   Diagnosis Date    Asthma     Cholelithiasis     Dizziness     Migraine aura, persistent     Nausea & vomiting    ,   Past Surgical History:   Procedure Laterality Date    CHOLECYSTECTOMY      CHOLECYSTECTOMY      ENDOMETRIAL ABLATION      KNEE ARTHROSCOPY Right 12/3/13    VAS partial menisectomy partial lateral meniscectomy, chondroplasty synovectomy    TONSILLECTOMY      WRIST SURGERY  1/13    rt wrist   ,   Social History     Tobacco Use    Smoking status: Never Smoker    Smokeless tobacco: Never Used   Vaping Use    Vaping Use: Never used   Substance Use Topics    Alcohol use: No    Drug use: No   , History reviewed. No pertinent family history. ,   There is no immunization history on file for this patient.,   Health Maintenance   Topic Date Due    Diabetic foot exam  Never done    Diabetic retinal exam  Never done    COVID-19 Vaccine (1) Never done    Diabetic microalbuminuria test  Never done    Cervical cancer screen  Never done    Colon cancer screen colonoscopy  Never done    Breast cancer screen  Never done    Shingles Vaccine (1 of 2) Never done    Flu vaccine (1) 09/01/2021    Lipid screen  05/28/2022    A1C test (Diabetic or Prediabetic)  06/03/2022    Potassium monitoring  06/03/2022    Creatinine monitoring  06/03/2022    DTaP/Tdap/Td vaccine (2 - Td or Tdap) 03/23/2025    Pneumococcal 0-64 years Vaccine (2 of 2 - PPSV23) 09/28/2029    Hepatitis C screen  Completed    HIV screen  Completed    Hepatitis A vaccine  Aged Out    Hepatitis B vaccine  Aged Out    Hib vaccine  Aged Out    Meningococcal (ACWY) vaccine  Aged Out       PHYSICAL EXAMINATION:  Telephone visit  [ INSTRUCTIONS:  \"[x]\" Indicates a positive item  \"[]\" Indicates a negative item  -- DELETE ALL ITEMS NOT EXAMINED]  Vital Signs: (As obtained by patient/caregiver or practitioner observation)    Blood pressure-  Heart rate-    Respiratory rate-    Temperature-  Pulse oximetry-     Constitutional: [] Appears well-developed and well-nourished [] No apparent distress      [] Abnormal-   Mental status  [] Alert and awake  [] Oriented to person/place/time []Able to follow commands      Eyes:  EOM    []  Normal  [] Abnormal-  Sclera  []  Normal  [] Abnormal -         Discharge []  None visible  [] Abnormal -    HENT:   [] Normocephalic, atraumatic.   [] Abnormal   [] Mouth/Throat: Mucous membranes are moist.     External Ears [] Normal  [] Abnormal-     Neck: [] No visualized mass     Pulmonary/Chest: [] Respiratory effort normal.  [] No visualized signs of difficulty breathing or respiratory distress        [] Abnormal-      Musculoskeletal:   [] Normal gait with no signs of ataxia         [] Normal range of motion of neck        [] Abnormal-       Neurological:        [] No Facial Asymmetry (Cranial nerve 7 motor function) (limited exam to video visit)          [] No gaze palsy        [] Abnormal- Skin:        [] No significant exanthematous lesions or discoloration noted on facial skin         [] Abnormal-            Psychiatric:       [] Normal Affect [] No Hallucinations        [] Abnormal-     Other pertinent observable physical exam findings-     ASSESSMENT/PLAN:  1. Pneumonia of right lower lobe due to infectious organism  See HPI. Will prescribe another round of antibiotics. Advised pt to continue to monitor O2 sat at home. Protocol is to go to the ER if it is less than 92% on room air. F/u with me if symptoms persist or worsen. Instructed on how to do deep breathing exercises and recommended that she do them 10 times every hour while awake. Also recommended mucinex (blue box) to help with chest congestion.    - amoxicillin (AMOXIL) 500 MG capsule; Take 2 capsules by mouth 3 times daily for 7 days  Dispense: 42 capsule; Refill: 0  - doxycycline hyclate (VIBRA-TABS) 100 MG tablet; Take 1 tablet by mouth 2 times daily for 7 days  Dispense: 14 tablet; Refill: 0    2. Cough  See HPI. Advised pt that the cough could last several weeks. She states that the cough is not allowing her to get any rest.  Will d/c phenergan DM and tessalon perles. Advised pt not to take any other cough suppressants with phenergan with codeine. She is also aware that this is a controlled substance and to not drink alcohol or operate heavy machinery when taking the cough medication. - promethazine-codeine (PHENERGAN WITH CODEINE) 6.25-10 MG/5ML syrup; Take 5 mLs by mouth every 4 hours as needed for Cough for up to 3 days. Dispense: 90 mL; Refill: 0      Return if symptoms worsen or fail to improve. Panchito Mcgrath, was evaluated through a synchronous (real-time) audio-video encounter. The patient (or guardian if applicable) is aware that this is a billable service. Verbal consent to proceed has been obtained within the past 12 months.  The visit was conducted pursuant to the emergency declaration under the [address] and the [address] authority and the [employer] General Act. Patient identification was verified, and a caregiver was present when appropriate. The patient was located in a state where the provider was credentialed to provide care. Total time spent on this encounter: 25 minutes    --MARIAH Roach CNP on 8/25/2021 at 10:54 AM    An electronic signature was used to authenticate this note.

## 2022-10-12 ENCOUNTER — TELEPHONE (OUTPATIENT)
Dept: FAMILY MEDICINE CLINIC | Age: 57
End: 2022-10-12

## 2022-10-12 NOTE — TELEPHONE ENCOUNTER
Pt over due for DM follow up. No A1C since 10/11/21. Please help to schedule.    Must be in office not virtual.

## 2022-12-12 ENCOUNTER — TELEPHONE (OUTPATIENT)
Dept: FAMILY MEDICINE CLINIC | Age: 57
End: 2022-12-12

## 2023-03-02 ENCOUNTER — TELEPHONE (OUTPATIENT)
Dept: FAMILY MEDICINE CLINIC | Age: 58
End: 2023-03-02

## 2023-03-02 NOTE — TELEPHONE ENCOUNTER
Left message for Mayank Samson to call the office back. Labs will be drawn either at the time of the appointment or after the appointment.

## 2023-03-02 NOTE — TELEPHONE ENCOUNTER
----- Message from Bradley sent at 3/2/2023  4:30 PM EST -----  Subject: Message to Provider    QUESTIONS  Information for Provider? pt has appt on 03/21/2023 for a wellness   visit-pt was wondering if she needed to complete bloodwork before the   appt-asked for callback for clarification. please return pt's call.   ---------------------------------------------------------------------------  --------------  [address] [facility]  [phone]; OK to leave message on voicemail  ---------------------------------------------------------------------------  --------------  SCRIPT ANSWERS  Relationship to Patient?  Self

## 2023-03-05 ENCOUNTER — APPOINTMENT (OUTPATIENT)
Dept: CT IMAGING | Age: 58
DRG: 069 | End: 2023-03-05
Payer: COMMERCIAL

## 2023-03-05 ENCOUNTER — HOSPITAL ENCOUNTER (INPATIENT)
Age: 58
LOS: 1 days | Discharge: HOME | DRG: 069 | End: 2023-03-06
Attending: EMERGENCY MEDICINE | Admitting: INTERNAL MEDICINE
Payer: COMMERCIAL

## 2023-03-05 ENCOUNTER — APPOINTMENT (OUTPATIENT)
Dept: MRI IMAGING | Age: 58
DRG: 069 | End: 2023-03-05
Payer: COMMERCIAL

## 2023-03-05 DIAGNOSIS — R07.9 CHEST PAIN, UNSPECIFIED TYPE: ICD-10-CM

## 2023-03-05 DIAGNOSIS — R29.90 STROKE-LIKE SYMPTOMS: Primary | ICD-10-CM

## 2023-03-05 PROBLEM — G45.9 TIA (TRANSIENT ISCHEMIC ATTACK): Status: ACTIVE | Noted: 2023-03-05

## 2023-03-05 LAB
ANION GAP SERPL CALCULATED.3IONS-SCNC: 12 MMOL/L (ref 3–16)
BASOPHILS ABSOLUTE: 0.1 K/UL (ref 0–0.2)
BASOPHILS RELATIVE PERCENT: 1 %
BUN BLDV-MCNC: 12 MG/DL (ref 7–20)
CALCIUM SERPL-MCNC: 9.9 MG/DL (ref 8.3–10.6)
CHLORIDE BLD-SCNC: 97 MMOL/L (ref 99–110)
CO2: 26 MMOL/L (ref 21–32)
CREAT SERPL-MCNC: 0.8 MG/DL (ref 0.6–1.1)
EKG ATRIAL RATE: 73 BPM
EKG DIAGNOSIS: NORMAL
EKG P AXIS: 13 DEGREES
EKG P-R INTERVAL: 134 MS
EKG Q-T INTERVAL: 392 MS
EKG QRS DURATION: 76 MS
EKG QTC CALCULATION (BAZETT): 431 MS
EKG R AXIS: 7 DEGREES
EKG T AXIS: 19 DEGREES
EKG VENTRICULAR RATE: 73 BPM
EOSINOPHILS ABSOLUTE: 0.3 K/UL (ref 0–0.6)
EOSINOPHILS RELATIVE PERCENT: 2.7 %
GFR SERPL CREATININE-BSD FRML MDRD: >60 ML/MIN/{1.73_M2}
GLUCOSE BLD-MCNC: 116 MG/DL (ref 70–99)
GLUCOSE BLD-MCNC: 137 MG/DL (ref 70–99)
HCT VFR BLD CALC: 41.5 % (ref 36–48)
HEMOGLOBIN: 13.9 G/DL (ref 12–16)
INR BLD: 1.02 (ref 0.87–1.14)
LYMPHOCYTES ABSOLUTE: 2.5 K/UL (ref 1–5.1)
LYMPHOCYTES RELATIVE PERCENT: 25.5 %
MCH RBC QN AUTO: 30.9 PG (ref 26–34)
MCHC RBC AUTO-ENTMCNC: 33.4 G/DL (ref 31–36)
MCV RBC AUTO: 92.3 FL (ref 80–100)
MONOCYTES ABSOLUTE: 0.6 K/UL (ref 0–1.3)
MONOCYTES RELATIVE PERCENT: 5.8 %
NEUTROPHILS ABSOLUTE: 6.4 K/UL (ref 1.7–7.7)
NEUTROPHILS RELATIVE PERCENT: 65 %
PDW BLD-RTO: 13.4 % (ref 12.4–15.4)
PERFORMED ON: ABNORMAL
PLATELET # BLD: 359 K/UL (ref 135–450)
PMV BLD AUTO: 8.4 FL (ref 5–10.5)
POTASSIUM REFLEX MAGNESIUM: 4 MMOL/L (ref 3.5–5.1)
PROTHROMBIN TIME: 13.3 SEC (ref 11.7–14.5)
RBC # BLD: 4.5 M/UL (ref 4–5.2)
SODIUM BLD-SCNC: 135 MMOL/L (ref 136–145)
TROPONIN: <0.01 NG/ML
WBC # BLD: 9.8 K/UL (ref 4–11)

## 2023-03-05 PROCEDURE — 6370000000 HC RX 637 (ALT 250 FOR IP): Performed by: EMERGENCY MEDICINE

## 2023-03-05 PROCEDURE — 6370000000 HC RX 637 (ALT 250 FOR IP): Performed by: INTERNAL MEDICINE

## 2023-03-05 PROCEDURE — 6360000004 HC RX CONTRAST MEDICATION: Performed by: EMERGENCY MEDICINE

## 2023-03-05 PROCEDURE — 93005 ELECTROCARDIOGRAM TRACING: CPT | Performed by: EMERGENCY MEDICINE

## 2023-03-05 PROCEDURE — 93010 ELECTROCARDIOGRAM REPORT: CPT | Performed by: INTERNAL MEDICINE

## 2023-03-05 PROCEDURE — 1200000000 HC SEMI PRIVATE

## 2023-03-05 PROCEDURE — 70498 CT ANGIOGRAPHY NECK: CPT

## 2023-03-05 PROCEDURE — 85610 PROTHROMBIN TIME: CPT

## 2023-03-05 PROCEDURE — 6360000002 HC RX W HCPCS: Performed by: INTERNAL MEDICINE

## 2023-03-05 PROCEDURE — 99285 EMERGENCY DEPT VISIT HI MDM: CPT

## 2023-03-05 PROCEDURE — 85025 COMPLETE CBC W/AUTO DIFF WBC: CPT

## 2023-03-05 PROCEDURE — 70450 CT HEAD/BRAIN W/O DYE: CPT

## 2023-03-05 PROCEDURE — 36415 COLL VENOUS BLD VENIPUNCTURE: CPT

## 2023-03-05 PROCEDURE — 92610 EVALUATE SWALLOWING FUNCTION: CPT

## 2023-03-05 PROCEDURE — 97530 THERAPEUTIC ACTIVITIES: CPT

## 2023-03-05 PROCEDURE — 70551 MRI BRAIN STEM W/O DYE: CPT

## 2023-03-05 PROCEDURE — 97161 PT EVAL LOW COMPLEX 20 MIN: CPT

## 2023-03-05 PROCEDURE — 80048 BASIC METABOLIC PNL TOTAL CA: CPT

## 2023-03-05 PROCEDURE — 84484 ASSAY OF TROPONIN QUANT: CPT

## 2023-03-05 PROCEDURE — 92526 ORAL FUNCTION THERAPY: CPT

## 2023-03-05 RX ORDER — ENOXAPARIN SODIUM 100 MG/ML
30 INJECTION SUBCUTANEOUS 2 TIMES DAILY
Status: DISCONTINUED | OUTPATIENT
Start: 2023-03-05 | End: 2023-03-06 | Stop reason: HOSPADM

## 2023-03-05 RX ORDER — ONDANSETRON 2 MG/ML
4 INJECTION INTRAMUSCULAR; INTRAVENOUS EVERY 6 HOURS PRN
Status: DISCONTINUED | OUTPATIENT
Start: 2023-03-05 | End: 2023-03-06 | Stop reason: HOSPADM

## 2023-03-05 RX ORDER — PANTOPRAZOLE SODIUM 40 MG/1
40 TABLET, DELAYED RELEASE ORAL
Status: DISCONTINUED | OUTPATIENT
Start: 2023-03-06 | End: 2023-03-06 | Stop reason: HOSPADM

## 2023-03-05 RX ORDER — ONDANSETRON 4 MG/1
4 TABLET, ORALLY DISINTEGRATING ORAL EVERY 8 HOURS PRN
Status: DISCONTINUED | OUTPATIENT
Start: 2023-03-05 | End: 2023-03-06 | Stop reason: HOSPADM

## 2023-03-05 RX ORDER — LABETALOL HYDROCHLORIDE 5 MG/ML
10 INJECTION, SOLUTION INTRAVENOUS EVERY 10 MIN PRN
Status: DISCONTINUED | OUTPATIENT
Start: 2023-03-05 | End: 2023-03-06 | Stop reason: HOSPADM

## 2023-03-05 RX ORDER — ASPIRIN 81 MG/1
81 TABLET ORAL DAILY
Status: DISCONTINUED | OUTPATIENT
Start: 2023-03-05 | End: 2023-03-06 | Stop reason: HOSPADM

## 2023-03-05 RX ORDER — ASPIRIN 300 MG/1
300 SUPPOSITORY RECTAL DAILY
Status: DISCONTINUED | OUTPATIENT
Start: 2023-03-05 | End: 2023-03-06 | Stop reason: HOSPADM

## 2023-03-05 RX ORDER — LISINOPRIL 20 MG/1
20 TABLET ORAL DAILY
Status: DISCONTINUED | OUTPATIENT
Start: 2023-03-05 | End: 2023-03-06 | Stop reason: HOSPADM

## 2023-03-05 RX ORDER — ALBUTEROL SULFATE 90 UG/1
2 AEROSOL, METERED RESPIRATORY (INHALATION) EVERY 6 HOURS PRN
Status: DISCONTINUED | OUTPATIENT
Start: 2023-03-05 | End: 2023-03-06 | Stop reason: HOSPADM

## 2023-03-05 RX ORDER — FLUTICASONE PROPIONATE 110 UG/1
1 AEROSOL, METERED RESPIRATORY (INHALATION) 2 TIMES DAILY
Status: DISCONTINUED | OUTPATIENT
Start: 2023-03-05 | End: 2023-03-05

## 2023-03-05 RX ORDER — FLUTICASONE PROPIONATE 110 UG/1
1 AEROSOL, METERED RESPIRATORY (INHALATION) 2 TIMES DAILY
Status: DISCONTINUED | OUTPATIENT
Start: 2023-03-05 | End: 2023-03-06 | Stop reason: HOSPADM

## 2023-03-05 RX ORDER — POLYETHYLENE GLYCOL 3350 17 G/17G
17 POWDER, FOR SOLUTION ORAL DAILY PRN
Status: DISCONTINUED | OUTPATIENT
Start: 2023-03-05 | End: 2023-03-06 | Stop reason: HOSPADM

## 2023-03-05 RX ORDER — ASPIRIN 325 MG
325 TABLET ORAL ONCE
Status: COMPLETED | OUTPATIENT
Start: 2023-03-05 | End: 2023-03-05

## 2023-03-05 RX ORDER — ATORVASTATIN CALCIUM 10 MG/1
20 TABLET, FILM COATED ORAL DAILY
Status: DISCONTINUED | OUTPATIENT
Start: 2023-03-05 | End: 2023-03-06 | Stop reason: HOSPADM

## 2023-03-05 RX ADMIN — ASPIRIN 325 MG: 325 TABLET ORAL at 11:43

## 2023-03-05 RX ADMIN — ENOXAPARIN SODIUM 30 MG: 100 INJECTION SUBCUTANEOUS at 14:41

## 2023-03-05 RX ADMIN — ATORVASTATIN CALCIUM 20 MG: 10 TABLET, FILM COATED ORAL at 14:41

## 2023-03-05 RX ADMIN — IOPAMIDOL 75 ML: 755 INJECTION, SOLUTION INTRAVENOUS at 10:15

## 2023-03-05 NOTE — PROGRESS NOTES
Received page from ER regarding possible admission. Forwarded to Dr. Partha Hernandez, admitting provider.

## 2023-03-05 NOTE — PROGRESS NOTES
Aspiration Screen    Name: Jamin Gilbert  : 1965  Medical Diagnosis: TIA (transient ischemic attack) [G45.9]    Swallow screen to rule out aspiration completed per CVA protocol. Patient demonstrates some high risk indicators for potential dysphagia / aspiration per admitting diagnosis. RECOMMEND: Clinical Swallow Evaluation at bedside to assess swallowing function, rule out aspiration, and determine appropriate diet level.      Edith Murray MA. CF- SLP  Speech Language Pathologist  [id number]

## 2023-03-05 NOTE — ED PROVIDER NOTES
[facility]  ED  EMERGENCY DEPARTMENT ENCOUNTER        Patient Name: Mohan Cortes  MRN: [id number]  [place] 1965  Date of evaluation: 3/5/2023  Provider: Latisha Galicia MD  PCP: MARIAH Nielsen CNP  Note Started: 11:09 AM EST 3/5/23    CHIEF COMPLAINT       High blood pressure, numbness and tingling    HISTORY OF PRESENT ILLNESS: 1 or more Elements     History from : Patient    Limitations to history : None    Mohan Cortes is a 61 y.o. female who presents for evaluation of numbness and tingling of the right side of the face, right arm, high blood pressure. Patient states that she noted high blood pressure this morning while at home. Around 8 AM, she developed right-sided facial numbness, weakness, right-sided arm numbness. She also states that she has had developed chest heaviness. She denies any prior cardiac history denies prior cardiac work-up. She denies any weakness in her legs. She states that the numbness has somewhat improved in the face but continues to have some symptoms of the right-sided neck and right arm. No weakness. Nursing Notes were all reviewed and agreed with or any disagreements were addressed in the HPI. REVIEW OF SYSTEMS :      Review of Systems    Positives and Pertinent negatives as per HPI.      SURGICAL HISTORY     Past Surgical History:   Procedure Laterality Date    CHOLECYSTECTOMY      CHOLECYSTECTOMY      ENDOMETRIAL ABLATION      KNEE ARTHROSCOPY Right 12/3/13    VAS partial menisectomy partial lateral meniscectomy, chondroplasty synovectomy    TONSILLECTOMY      WRIST SURGERY  1/13    rt wrist       CURRENTMEDICATIONS       Previous Medications    ALBUTEROL SULFATE HFA (PROVENTIL HFA) 108 (90 BASE) MCG/ACT INHALER    Inhale 2 puffs into the lungs every 6 hours as needed for Wheezing    ATORVASTATIN (LIPITOR) 20 MG TABLET    Take 1 tablet by mouth daily    BLOOD PRESSURE KIT    1 kit by Does not apply route 2 times daily    FLOVENT  MCG/ACT INHALER        OMEPRAZOLE (PRILOSEC) 40 MG DELAYED RELEASE CAPSULE    Take 1 capsule by mouth daily    VITAMIN D (VITAMIN D3 MAXIMUM STRENGTH) 125 MCG (5000 UT) CAPS CAPSULE           ALLERGIES     Adhesive tape, Clarithromycin, and Sulfa antibiotics    FAMILYHISTORY     No family history on file. SOCIAL HISTORY       Social History     Tobacco Use    Smoking status: Never    Smokeless tobacco: Never   Vaping Use    Vaping Use: Never used   Substance Use Topics    Alcohol use: No    Drug use: No       SCREENINGS                         CIWA Assessment  BP: (!) 152/68  Heart Rate: 90           PHYSICAL EXAM  1 or more Elements     ED Triage Vitals   BP Temp Temp src Heart Rate Resp SpO2 Height Weight   03/05/23 0954 03/05/23 1021 -- 03/05/23 0954 03/05/23 0954 03/05/23 0954 -- 03/05/23 0954   (!) 145/99 98 °F (36.7 °C)  90 18 99 %  232 lb (105.2 kg)       General: No acute distress. Alert and Oriented. Appears stated age. HEENT:  No difficulty tolerating oral secretions. Cardiac: Regular rate and rhythm. Radial pulses are intact bilaterally. Chest: No respiratory distress. Clear breath sounds bilaterally. No increased work of breathing. No use of accessory muscles for respiration. Abdomen: Soft, nontender, nondistended, non-peritonitic. Extremities:No significant lower extremity edema. Lower extremities are symmetric. Neuro: Neuro:  CN I not assessed. Pupils are equal, round and reactive to light bilaterally. Extraocular motions are full and intact bilaterally. Facial sensation to light touch in the trigeminal distribution is intact bilaterally. Patient able to raise bilateral eyebrows. Symmetric smile. Tongue protrudes midline. Able to puff out cheeks bilaterally. Shrugs shoulders bilaterally. Speech is clear and fluent. No difficulty with word finding. . Truncal ataxia is not present.  Strength is 5/5 in the bilateral upper extremities (flexion and extension at elbow, wrist, intact and strong  of the hands). Strength is 5/5 in the bilateral lower extremities (flexion and extension at hip, knee and plantarflexion and dorsiflexion at the ankle), intact sensation to light touch in all four extremities. There is no pronator drift. Finger to nose testing intact. Rapid alternating hand movements intact. Gait is stable. NIHSS: 0.       Skin:No rash, no erythema  Psych: Calm and cooperative. DIAGNOSTIC RESULTS   LABS:    Labs Reviewed   BASIC METABOLIC PANEL W/ REFLEX TO MG FOR LOW K - Abnormal; Notable for the following components:       Result Value    Sodium 135 (*)     Chloride 97 (*)     Glucose 137 (*)     All other components within normal limits   POCT GLUCOSE - Abnormal; Notable for the following components:    POC Glucose 116 (*)     All other components within normal limits   CBC WITH AUTO DIFFERENTIAL   TROPONIN   PROTIME-INR   POCT GLUCOSE   POCT PT/INR       When ordered only abnormal lab results are displayed. All other labs were within normal range or not returned as of this dictation. EKG  The EKG, as interpreted by myself, in the emergency department in the absence of a cardiologist.  normal sinus rhythm with a rate of 73  Axis is   Normal  QTc is  within an acceptable range  Intervals and Durations are unremarkable. No specific ST-T wave changes appreciated. No evidence of acute ischemia. No significant change from prior EKG dated August 30, 2021      RADIOLOGY:   Non-plain film images such as CT, Ultrasound and MRI are read by the radiologist. Plain radiographic images are visualized and preliminarily interpreted by the ED Provider with the below findings:        Interpretation per the Radiologist below, if available at the time of this note:    CTA HEAD NECK W CONTRAST   Final Result   No flow limiting stenosis or occlusion within the head or neck. CT HEAD WO CONTRAST   Final Result   No acute intracranial abnormality.       The findings were sent to the Radiology Results Communication Center at 10:22   am on 3/5/2023 to be communicated to a licensed caregiver. CT HEAD WO CONTRAST    Result Date: 3/5/2023  EXAMINATION: CT OF THE HEAD WITHOUT CONTRAST  3/5/2023 10:03 am TECHNIQUE: CT of the head was performed without the administration of intravenous contrast. Automated exposure control, iterative reconstruction, and/or weight based adjustment of the mA/kV was utilized to reduce the radiation dose to as low as reasonably achievable. COMPARISON: 03/26/2021 HISTORY: ORDERING SYSTEM PROVIDED HISTORY: Stroke Symptoms TECHNOLOGIST PROVIDED HISTORY: Has a \"code stroke\" or \"stroke alert\" been called? ->Yes Reason for exam:->Stroke Symptoms Decision Support Exception - unselect if not a suspected or confirmed emergency medical condition->Emergency Medical Condition (MA) Reason for Exam: dizziness, feeling \"different\" on right side of face x 1 hour ago, r/o stroke; high bp FINDINGS: BRAIN/VENTRICLES: There is no acute intracranial hemorrhage, mass effect or midline shift. No abnormal extra-axial fluid collection. The gray-white differentiation is maintained without evidence of an acute infarct. There is no evidence of hydrocephalus. ORBITS: The visualized portion of the orbits demonstrate no acute abnormality. SINUSES: The visualized paranasal sinuses and mastoid air cells demonstrate no acute abnormality. SOFT TISSUES/SKULL:  No acute abnormality of the visualized skull or soft tissues. No acute intracranial abnormality. The findings were sent to the Radiology Results [address] at 10:22 am on 3/5/2023 to be communicated to a licensed caregiver. CTA HEAD NECK W CONTRAST    Result Date: 3/5/2023  EXAMINATION: CTA OF THE HEAD AND NECK WITH CONTRAST 3/5/2023 10:07 am: TECHNIQUE: CTA of the head and neck was performed with the administration of intravenous contrast. Multiplanar reformatted images are provided for review. MIP images are provided for review. Stenosis of the internal carotid arteries measured using NASCET criteria. Automated exposure control, iterative reconstruction, and/or weight based adjustment of the mA/kV was utilized to reduce the radiation dose to as low as reasonably achievable. 3D surface reformatted and/or curved MIP images were performed on an independent workstation and submitted for review. This scan was analyzed using [email or web address]. ai contact LVO. Identification of suspected findings is not for diagnostic use beyond notification. Viz LVO is limited to analysis of imaging data and should not be used in-lieu of full patient evaluation or relied upon to make or confirm diagnosis. COMPARISON: None. HISTORY: ORDERING SYSTEM PROVIDED HISTORY: Stroke Symptoms FINDINGS: CTA NECK: AORTIC ARCH/ARCH VESSELS: No dissection or arterial injury. No significant stenosis of the brachiocephalic or subclavian arteries. CAROTID ARTERIES: No dissection, arterial injury, or hemodynamically significant stenosis by NASCET criteria. VERTEBRAL ARTERIES: No dissection, arterial injury, or significant stenosis. SOFT TISSUES: The lung apices are clear. No cervical or superior mediastinal lymphadenopathy. The larynx and pharynx are unremarkable. No acute abnormality of the salivary and thyroid glands. BONES: No acute osseous abnormality. CTA HEAD: ANTERIOR CIRCULATION: No significant stenosis of the intracranial internal carotid, anterior cerebral, or middle cerebral arteries. No aneurysm. POSTERIOR CIRCULATION: No significant stenosis of the vertebral, basilar, or posterior cerebral arteries. No aneurysm. 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 OTHER: No dural venous sinus thrombosis on this non-dedicated study. BRAIN: No mass effect or midline shift. No extra-axial fluid collection. The gray-white differentiation is maintained. No flow limiting stenosis or occlusion within the head or neck.          Bedside Ultrasound, as interpreted by me, if performed:    No results found. PROCEDURES     Unless otherwise noted below, none     Procedures    CRITICAL CARE TIME     I personally spent a total of 15 minutes of critical care time in obtaining history, performing a physical exam, bedside monitoring of interventions, collecting and interpreting tests and discussion with consultants but excluding time spent performing procedures, treating other patients and teaching time. PAST MEDICAL HISTORY      has a past medical history of Asthma, Cholelithiasis, Dizziness, Hypertension, Migraine aura, persistent, and Nausea & vomiting. EMERGENCY DEPARTMENT COURSE and DIFFERENTIAL DIAGNOSIS/MDM:     Vitals:    Vitals:    03/05/23 0954 03/05/23 1021   BP: (!) 145/99 (!) 152/68   Pulse: 90    Resp: 18 16   Temp:  98 °F (36.7 °C)   SpO2: 99%    Weight: 232 lb (105.2 kg)        Patient was treated with and given the following medications:  Medications   iopamidol (ISOVUE-370) 76 % injection 75 mL (75 mLs IntraVENous Given 3/5/23 1015)   aspirin tablet 325 mg (325 mg Oral Given 3/5/23 1143)             Is this patient to be included in the SEP-1 Core Measure due to severe sepsis or septic shock? No   Exclusion criteria - the patient is NOT to be included for SEP-1 Core Measure due to: Infection is not suspected    CC/HPI Summary, DDx, ED Course, and Reassessment:     26-year-old female presenting for evaluation of strokelike symptoms, high blood pressure. Blood pressure here is in the 150s. Patient is NIH of 0 however does endorse some mild right-sided facial numbness, right arm numbness. She has no focal weakness on exam no facial droop. Code stroke was called because of her continued symptoms although they are improving. CT CTA imaging has been ordered as well as cardiac work-up.   I spoke with the  stroke team, Dr. Rylee Anne regarding her symptoms. As patient has non-debilitating symptoms, patient does not qualify for thrombolysis. CT CTA imaging is unremarkable. Initial troponin is negative. Based on her risk factors from a cardiac perspective, patient is agreeable for chest pain rule out admission. As she still has some mild numbness of the right side of the neck and arm, patient will be admitted for further stroke work-up. The differential diagnosis associated with the patient's presentation includes, but is not limited to: CVA, TIA, ACS, migraine    CONSULTS: (Who and What was discussed)  None    Discussion with Other Professionals : Admitting Team   and Consultant  stroke team        Social Determinants : None    Patient's care impacted by chronic condition(s): HTN, obesity    Records Reviewed : None    Clinical information obtained from an independent historian. Disposition Considerations (include 1 Tests not done, Shared Decision Making, Pt Expectation of Test or Tx.):     Lab evaluation was unremarkable. Patient will be admitted for chest pain rule out, neurology workup. I am the Primary Clinician of Record. FINAL IMPRESSION      1. Stroke-like symptoms    2. Chest pain, unspecified type          DISPOSITION/PLAN     DISPOSITION Admitted 03/05/2023 11:39:26 AM      PATIENT REFERRED TO:  No follow-up provider specified. DISCHARGE MEDICATIONS:  Patient was given scripts for the following medications. I counseled patient how to take these medications:  New Prescriptions    No medications on file       DISCONTINUED MEDICATIONS:  Discontinued Medications    No medications on file              (This chart was generated in part by using Dragon Dictation system and may contain errors related to that system including errors in grammar, punctuation, and spelling, as well as words and phrases that may be inappropriate.  If there are any questions or concerns please feel free to contact the dictating provider for clarification.)    MD Socrates Head MD  03/05/23 0733

## 2023-03-05 NOTE — H&P
Hospital Medicine History & Physical      PCP: MARIAH Alvarenga CNP    Date of Admission: 3/5/2023    Date of Service: Pt seen/examined on 3/5/2023 and Placed in Observation. Chief Complaint:    Paresthesias      History Of Present Illness:      61 y.o. female who [facility] with paresthesias of the right face and right arm. Patient states her symptoms started about 8 AM.  Patient states she did not have any associated headache. Patient has had headaches associated with migraines previously. Patient denies any visual changes. She denies any dysphagia. Patient states her symptoms were restricted to her right upper extremity and also her right face. Patient was recently started on lisinopril for uncontrolled hypertension. Patient with family history of atrial fibrillation and TIAs. Patient states at home she has noticed some intermittent irregular heartbeat. She states that she has not noted any A-fib on her Apple Watch. Past Medical History:          Diagnosis Date    Asthma     Cholelithiasis     Dizziness     Hypertension     Migraine aura, persistent     Nausea & vomiting        Past Surgical History:          Procedure Laterality Date    CHOLECYSTECTOMY      CHOLECYSTECTOMY      ENDOMETRIAL ABLATION      KNEE ARTHROSCOPY Right 12/3/13    VAS partial menisectomy partial lateral meniscectomy, chondroplasty synovectomy    TONSILLECTOMY      WRIST SURGERY  1/13    rt wrist       Medications Prior to Admission:      Prior to Admission medications    Medication Sig Start Date End Date Taking?  Authorizing Provider   albuterol sulfate HFA (PROVENTIL HFA) 108 (90 Base) MCG/ACT inhaler Inhale 2 puffs into the lungs every 6 hours as needed for Wheezing 5/27/22   MARIAH Garcia   omeprazole (PRILOSEC) 40 MG delayed release capsule Take 1 capsule by mouth daily 12/7/21   MARIAH Alvarenga CNP   vitamin D (VITAMIN D3 MAXIMUM STRENGTH) 125 MCG (5000 UT) CAPS capsule 10/1/21   Historical Provider, MD   [facility]  MCG/ACT inhaler  10/5/21   Historical Provider, MD   atorvastatin (LIPITOR) 20 MG tablet Take 1 tablet by mouth daily 10/13/21   MARIAH Blanchard CNP   Blood Pressure KIT 1 kit by Does not apply route 2 times daily 4/7/21   MARIAH Blanchard CNP       Allergies:  Adhesive tape, Clarithromycin, and Sulfa antibiotics    Social History:      The patient currently lives home    TOBACCO:   reports that she has never smoked. She has never used smokeless tobacco.  ETOH:   reports no history of alcohol use. E-cigarette/Vaping       Questions Responses    E-cigarette/Vaping Use Never User    Start Date     Passive Exposure     Quit Date     Counseling Given     Comments               Family History:      Reviewed and negative in regards to presenting illness/complaint. No family history on file. REVIEW OF SYSTEMS COMPLETED:   Pertinent positives as noted in the HPI. All other systems reviewed and negative. PHYSICAL EXAM PERFORMED:    BP (!) 152/68   Pulse 90   Temp 98 °F (36.7 °C)   Resp 16   Wt 232 lb (105.2 kg)   LMP 11/02/2015   SpO2 99%   BMI 40.45 kg/m²     General appearance:  No apparent distress, appears stated age and cooperative. HEENT:  Normal cephalic, atraumatic without obvious deformity. Pupils equal, round, and reactive to light. Extra ocular muscles intact. Conjunctivae/corneas clear. Neck: Supple, with full range of motion. No jugular venous distention. Trachea midline. Respiratory:  Normal respiratory effort. Clear to auscultation, bilaterally without Rales/Wheezes/Rhonchi. Cardiovascular:  Regular rate and rhythm with normal S1/S2 without murmurs, rubs or gallops. Abdomen: Soft, non-tender, non-distended with normal bowel sounds. Musculoskeletal:  No clubbing, cyanosis or edema bilaterally. Full range of motion without deformity. Skin: Skin color, texture, turgor normal.  No rashes or lesions.   Neurologic: Neurovascularly intact without any focal sensory/motor deficits. Cranial nerves: II-XII intact, grossly non-focal.  Psychiatric:  Alert and oriented, thought content appropriate, normal insight  Capillary Refill: Brisk,3 seconds, normal  Peripheral Pulses: +2 palpable, equal bilaterally       Labs:     Recent Labs     03/05/23  1000   WBC 9.8   HGB 13.9   HCT 41.5        Recent Labs     03/05/23  1000   *   K 4.0   CL 97*   CO2 26   BUN 12   CREATININE 0.8   CALCIUM 9.9     No results for input(s): AST, ALT, BILIDIR, BILITOT, ALKPHOS in the last 72 hours. Recent Labs     03/05/23  1000   INR 1.02     Recent Labs     03/05/23  1000   TROPONINI <0.01       Urinalysis:      Lab Results   Component Value Date/Time    NITRU Negative 04/13/2021 11:38 AM    BLOODU Negative 04/13/2021 11:38 AM    SPECGRAV 1.018 04/13/2021 11:38 AM    GLUCOSEU Negative 04/13/2021 11:38 AM       Radiology:     CXR: I have reviewed the CXR with the following interpretation: NA  EKG:  I have reviewed the EKG with the following interpretation: No acute ST-T wave changes rhythm is sinus rhythm    CTA HEAD NECK W CONTRAST   Final Result   No flow limiting stenosis or occlusion within the head or neck. CT HEAD WO CONTRAST   Final Result   No acute intracranial abnormality. The findings were sent to the Radiology Results [address] at 10:22   am on 3/5/2023 to be communicated to a licensed caregiver. Consults:    None    ASSESSMENT:    There are no active hospital problems to display for this patient. PLAN:    1. TIA. Patient will be started on aspirin and statin. MRI is ordered. CT and CTA unremarkable. Echocardiogram ordered. Neuro consulted. Patient mediated event monitor. 2.  Irregular heartbeat. Patient states she does have history of irregular heartbeat. She states she does not have any documented history of atrial fibrillation. Atrial fibrillation does run in her family.   3. Atypical migraines. Patient denies any associated headache currently. 4.  Uncontrolled hypertension. Blood pressure improved at this visit. Continue lisinopril. Will monitor closely. DVT Prophylaxis: Lovenox  Diet: Diet NPO  Code Status: No Order    PT/OT Eval Status: Pending    Dispo -Home when stable after neurology work-up completed. Ilana Garber MD    Thank you MARIAH Nevarez - KATHY for the opportunity to be involved in this patient's care. If you have any questions or concerns please feel free to contact me at [phone].

## 2023-03-05 NOTE — PLAN OF CARE
SLP completed evaluation. Please refer to notes in EMR.     Carina Christian MA. - SLP  Speech Language Pathologist  [id number]

## 2023-03-05 NOTE — PROGRESS NOTES
Physical Therapy  Facility/Department: [place] [address] - MED SURG/ORTHO  Physical Therapy Initial Assessment, Treatment and Discharge Summary    Name: Anali Muñoz  : 1965  MRN: [id number]  Date of Service: 3/5/2023    Discharge Recommendations:  Home independently   PT Equipment Recommendations  Equipment Needed: No      Patient Diagnosis(es): The primary encounter diagnosis was Stroke-like symptoms. A diagnosis of Chest pain, unspecified type was also pertinent to this visit. Past Medical History:  has a past medical history of Asthma, Cholelithiasis, Dizziness, Hypertension, Migraine aura, persistent, and Nausea & vomiting. Past Surgical History:  has a past surgical history that includes Tonsillectomy; Endometrial ablation; Cholecystectomy; Wrist surgery (); Knee arthroscopy (Right, 12/3/13); and Cholecystectomy. Assessment   Assessment: Patient is a 61year old female who was admitted to [facility] on 3/5/23 with a headache and numbness in her face & right arm. Patient reports that she still has numbness in her neck and right arm. Today she completed bed mobility & transfers with modified independence. She ambulated 150 feet with no assistive device and modified independence. She also ascended 3 stairs with a rail and modified independence. Patient appears to have returned to her prior independent level of function. Patient has met all of her acute PT goals. No additional skilled acute PT needs. Patient is safe for home, when medically stable. PT signing off. Treatment Diagnosis: Decreased independence with functional mobility  Specific Instructions for Next Treatment: N/A PT signing off.   Therapy Prognosis: Excellent  Decision Making: Low Complexity  Barriers to Learning: none  No Skilled PT: Independent with functional mobility   Requires PT Follow-Up: No  Activity Tolerance  Activity Tolerance: Patient tolerated evaluation without incident;Patient tolerated treatment well  Activity Tolerance Comments: seated at edge of bed post activity: 137/82 75 BPM. seated in chair post activity: 107/74 73 BPM. 95% on room air. Plan   Physcial Therapy Plan  General Plan: Discharge  Specific Instructions for Next Treatment: N/A PT signing off. Safety Devices  Type of Devices: All fall risk precautions in place, Call light within reach, Left in chair, Nurse notified, Gait belt, Patient at risk for falls (alarms were not activated upon entry of therapy in the room. patient's RN Sultana said that patient calls out for help appropriately and that she currently does not need a chair alarm.)     Restrictions  Restrictions/Precautions  Restrictions/Precautions: Fall Risk, General Precautions, Up as Tolerated  Position Activity Restriction  Other position/activity restrictions: telemetry     Subjective   Pain: No complaints of pain. General  Chart Reviewed: Yes  Patient assessed for rehabilitation services?: Yes  Additional Pertinent Hx: HPI per chart, \"55 y.o. female who presented to [facility] with paresthesias of the right face and right arm. Patient states her symptoms started about 8 AM. Patient states she did not have any associated headache. Patient has had headaches associated with migraines previously. Patient denies any visual changes. She denies any dysphagia. Patient states her symptoms were restricted to her right upper extremity and also her right face. Patient was recently started on lisinopril for uncontrolled hypertension. Patient with family history of atrial fibrillation and TIAs. Patient states at home she has noticed some intermittent irregular heartbeat. She states that she has not noted any A-fib on her Apple Watch. \"  Response To Previous Treatment: Not applicable  Family / Caregiver Present: No  Referring Practitioner: Diane Goodell, MD  Referral Date : 03/05/23  Follows Commands: Within Functional Limits  General Comment  Comments: Supine in bed upon entry of therapy staff.  CLeared for therapy by RN. Subjective  Subjective: Patient agreed to participate. Social/Functional History  Social/Functional History  Lives With: Family (mom and dad. both are retired)  Type of Home: House  Home Layout: Two level  Home Access: Stairs to enter with [address] - Number of Steps: 2 MELLO 14 steps to get to her bed & bath on 2nd floor. Entrance Stairs - Rails: Right  Bathroom Shower/Tub: Walk-in shower, Tub/Shower unit  Bathroom Toilet: Standard  Home Equipment: Maeve Rota, 4 wheeled  Has the patient had two or more falls in the past year or any fall with injury in the past year?: No  ADL Assistance: Independent  Homemaking Assistance: Independent  Homemaking Responsibilities: Yes  Meal Prep Responsibility: Primary  Laundry Responsibility: Primary  Cleaning Responsibility: Primary  Shopping Responsibility: Primary  Ambulation Assistance: Independent  Transfer Assistance: Independent  Active : Yes  Occupation: Retired  Vision/Hearing  Vision  Vision: Impaired  Vision Exceptions: Wears glasses for distance  Hearing  Hearing: Within functional limits    Cognition   Orientation  Overall Orientation Status: Within Normal Limits  Orientation Level: Oriented to person;Oriented to situation;Oriented to time;Oriented to place  Cognition  Overall Cognitive Status: WFL     Objective   Heart Rate: 72  Heart Rate Source: Monitor (Simultaneous filing. User may not have seen previous data.)  BP: 114/77  BP Location: Left upper arm  Patient Position: Semi fowlers  MAP (Calculated): 89  Resp: 18  SpO2: 94 %  O2 Device: None (Room air)  Comment: seated at edge of bed post activity: 137/82 75 BPM. seated in chair post activity: 107/74 73 BPM.  95% on room air. Observation/Palpation  Posture: Good  Gross Assessment  AROM: Within functional limits  PROM: Within functional limits  Strength:  Within functional limits  Coordination: Within functional limits  Sensation: Impaired (patient said that she has numbness in her neck and right shoulder area.)                 Bed Mobility Training  Bed Mobility Training: Yes  Overall Level of Assistance: Modified independent  Rolling: Modified independent  Supine to Sit: Modified independent  Sit to Supine: Modified independent  Scooting: Modified independent  Balance  Sitting: Intact  Sitting - Static: Good (unsupported)  Sitting - Dynamic: Good (unsupported)  Standing: Intact  Standing - Static: Good  Standing - Dynamic: Good  Transfer Training  Transfer Training: Yes  Overall Level of Assistance: Modified independent; Additional time (no assistive device)  Sit to Stand: Modified independent; Additional time  Stand to Sit: Modified independent; Additional time  Bed to Chair: Modified independent; Additional time  Gait Training  Gait Training: Yes  Gait  Overall Level of Assistance: Modified independent  Base of Support: Center of gravity altered; Widened  Speed/Suri: Pace decreased (< 100 feet/min); Slow  Step Length: Right shortened;Left shortened  Gait Abnormalities: Decreased step clearance  Distance (ft): 150 Feet (No complaints of shortness of breath, chest pain. she had some lightheadedness with standing but this quickly improved. no loss of balance.)  Assistive Device: Gait belt; Other (comment) (no assistive device)  Rail Use: Right  Stairs - Level of Assistance: Modified independent  Number of Stairs Trained: 3 (No complaints of shortness of breath, chest pain or dizziness. no loss of balance.  safe reciprocal step pattern)              Balance  Posture: Good  Sitting - Static: Good  Sitting - Dynamic: Good  Standing - Static: Good  Standing - Dynamic: Good  Comments: no assistive device           AM-PAC Score  AM-PAC Inpatient Mobility Raw Score : 24 (03/05/23 1730)  AM-PAC Inpatient T-Scale Score : 61.14 (03/05/23 1730)  Mobility Inpatient CMS 0-100% Score: 0 (03/05/23 1730)  Mobility Inpatient CMS G-Code Modifier : [address] (03/05/23 1730)          Goals  Short Term Goals  Time Frame for Short Term Goals: 1 session 3/5/23  Short Term Goal 1: Patient will be independent with bed mobility and transfers. Goal met on 3/5  Short Term Goal 2: Patient will ambulate 150 feet with no assistive device and modified independence. Goal met on 3/5  Short Term Goal 3: Patient will ascend/descend 3 steps with rail and mod I. Goal met on 3/5  Patient Goals   Patient Goals : To go home. Education  Patient Education  Education Given To: Patient  Education Provided: Role of Therapy;Plan of Care; Fall Prevention Strategies;Transfer Training  Education Provided Comments: Disease Specific Education: Patient educated on importance of out of bed mobility, prevention of complications of bedrest, and general safety (importance of using call bell) during hospitalization.  Patient verbalized understanding  Education Method: Demonstration;Verbal  Barriers to Learning: None  Education Outcome: Verbalized understanding;Demonstrated understanding      Therapy Time   Individual Concurrent Group Co-treatment   Time In 1648         Time Out 1712         Minutes 24         Timed Code Treatment Minutes: 14 Minutes (10 minute evaluation)       Avtar Burt, PT

## 2023-03-05 NOTE — PROGRESS NOTES
Speech Language Pathology  Clinical Bedside Swallow Assessment  Facility/Department: [facility] - MED SURG/ORTHO    Recommendations:  Diet recommendation: IDDSI 7 Regular Solids; IDDSI 0 Thin Liquids; Meds PO as tolerated  Instrumentation: Not clinically indicated at this time. Will continue to monitor. Risk management: upright for all intake, stay upright for at least 30 mins after intake, small bites/sips, oral care 2-3x/day to reduce adverse affects in the event of aspiration, increase physical mobility as able, slow rate of intake, general GERD precautions, general aspiration precautions, and hold PO and contact SLP if s/s of aspiration or worsening respiratory status develop. NAME:Princess Villagomez  : 1965 (58 y.o.)   MRN: [id number]  ROOM: [address] [place] [address]  ADMISSION DATE: 3/5/2023  PATIENT DIAGNOSIS(ES): TIA (transient ischemic attack) [G45.9]  Stroke-like symptoms [R29.90]  Chest pain, unspecified type [R07.9]  No chief complaint on file.     Patient Active Problem List    Diagnosis Date Noted    TIA (transient ischemic attack) 2023    Hypertension 2021    Plantar fasciitis, left 10/12/2016    Sprain of right ankle 10/05/2015    Right ankle pain 10/05/2015    Ankle pain 10/05/2015    Plantar fasciitis, right 06/10/2015    Essential tremor 2015    Adiposity 2015    Other viral warts 2014    Localized osteoarthrosis, lower leg 2013    Lateral meniscus tear 2013     Past Medical History:   Diagnosis Date    Asthma     Cholelithiasis     Dizziness     Hypertension     Migraine aura, persistent     Nausea & vomiting      Past Surgical History:   Procedure Laterality Date    CHOLECYSTECTOMY      CHOLECYSTECTOMY      ENDOMETRIAL ABLATION      KNEE ARTHROSCOPY Right 12/3/13    VAS partial menisectomy partial lateral meniscectomy, chondroplasty synovectomy    TONSILLECTOMY      WRIST SURGERY      rt wrist     Allergies   Allergen Reactions    Adhesive Tape Rash Clarithromycin Hives and Rash    Sulfa Antibiotics Hives, Itching and Rash     DATE ONSET: 3/5/2023    Date of Evaluation: 3/5/2023   Evaluating Therapist: Armaan Ness, SLP    Chart Reviewed: : [x] Yes [] No    Current Diet: ADULT DIET; Regular; Low Fat/Low Chol/High Fiber/LESIA    Recent Chest Radiography:   No recent imaging     Pain: The patient does not complain of pain     Reason for Referral  Ruth Bruner was referred for a bedside swallow evaluation to assess the efficiency of their swallow function, identify signs and symptoms of aspiration and make recommendations regarding safe dietary consistencies, effective compensatory strategies, and safe eating environment. Assessment    Medical record review/interview: Per MD H&P: Wood Hankins is a 61 y.o. female who presents for evaluation of numbness and tingling of the right side of the face, right arm, high blood pressure. Patient states that she noted high blood pressure this morning while at home. Around 8 AM, she developed right-sided facial numbness, weakness, right-sided arm numbness. She also states that she has had developed chest heaviness. She denies any prior cardiac history denies prior cardiac work-up. She denies any weakness in her legs. She states that the numbness has somewhat improved in the face but continues to have some symptoms of the right-sided neck and right arm. No weakness. Nursing Notes were all reviewed and agreed with or any disagreements were addressed in the HPI. \"    Predisposing dysphagia risk factors: N/A  Clinical signs of possible chronic dysphagia: N/A  Precipitating dysphagia risk factors: N/A    Patient Complaints:  Odynophagia: [] Yes [x] No  Globus Sensation: [x] Yes [] No  SOB with PO intake: [] Yes [x] No  Increased WOB with PO intake: [] Yes [x] No  Reflux Sx's: [x] Yes [] No  Weight loss: [] Yes [x] No  Coughing/Choking with PO intake: [x] Yes [] No  Reduced Appetite: [] Yes [x] No    Additional Reported Symptoms/Complaints/Hospital Course: No prior ST per chart review; pt reports no concerns for swallowing at this time    Pt's goals: \"to be healthy when I leave\"    Vitals/labs:   Temp: NA  SpO2: 97%  RR: 16/ minute  BP: 109/ 65  HR: 70  O2 device: RA    CBC:   Recent Labs     03/05/23  1000   WBC 9.8   HGB 13.9         BMP:  Recent Labs     03/05/23  1000   *   K 4.0   CL 97*   CO2 26   BUN 12   CREATININE 0.8   GLUCOSE 137*      Cranial nerve exam:   CN V (trigeminal): ophthalmic, maxillary, and mandibular facial sensation- WNL  CN VII (facial): WNL  CN IX/X (glossopharyngeal/vagus): MPT: Reduced; pitch range: Reduced; vocal quality: strained and weak; cough: Weak- perceptually, Non-Productive, and Dry  CN XII (hypoglossal): WNL    Laryngeal function exam:   Secretions: pink and moist oral mucosa  Vocal quality: See CN exam above  MPT: See CN exam above  S/Z ratio: DNT  Pitch range: See CN exam above  Cough: See CN exam above    Oral Care Status:    [] Oral Care [facility]  [] Poor oral care status  [x] Edentulous  [] Upper Dentures  [] Lower Dentures  [] Missing/Broken Teeth  [] Evidence of dental cavities/carries    PO trials:   IDDSI 0 (thin): no overt s/s associated with aspiration    IDDSI 7 (regular):  no overt s/s associated with aspiration    3 oz water: PASS    Impressions:  Pt alert, cooperative, pleasant, and agreeable to evaluation. Pt fully upright in bed with lunch tray present. Pt OME was unremarkable. Pt edentulous but wears dentures intermittently; pt reports avoiding tough meats but otherwise no difficulty with chewing. Pt reports intermittent globus sensation and strained vocal quality from coughing d/t respiratory illness. Pt assessed with thin liquids and regular solids.   Pt presenting with no anterior loss, functional oral prep and AP transit, functional mastication of regular solids, min oral residue- cleared with liquid wash, timely swallow initiation, no overt s/s associated with aspiration; no cough/ throat clear/ wet vocal quality. Pt denies any cognitive changes and reporting thinking is at baseline; pt declines cognitive linguistic assessment at this time but is willing to participate pending additional brain scans. Recommend regular solids, thin liquids and meds PO as tolerated. Recommendations:  Diet recommendation: IDDSI 7 Regular Solids; IDDSI 0 Thin Liquids; Meds PO as tolerated  Instrumentation: Not clinically indicated at this time. Will continue to monitor. Risk management: upright for all intake, stay upright for at least 30 mins after intake, small bites/sips, oral care 2-3x/day to reduce adverse affects in the event of aspiration, increase physical mobility as able, slow rate of intake, general GERD precautions, general aspiration precautions, and hold PO and contact SLP if s/s of aspiration or worsening respiratory status develop.     Prognosis: Good    Recommended Intervention:   [x] Dysphagia tx  [] Videostroboscopy                      [] NPO   [] MBS       [] Speech/Cog Eval    [x] Therapeutic PO Trials     [] Ice Chips   [] Other:  [] FEES                                                 Dysphagia Therapeutic Intervention:   []  Bolus control Exercises  []  Oral Motor Exercises  []  [employer] Protocol  []  Thermal Stimulation  [x]  Oral Care    []  Vital Stim/NMES  []  Laryngeal Exercises  [x]  Patient/Family Education  []  Pharyngeal Exercises  [x]  Therapeutic PO trials with SLP  [x]  Diet tolerance monitoring  []  Other:     Referrals:  [] ENT    [] PT  [] Pulmonology [] GI  [] Neurology  [] RD  [] OT   []     Goals:  Short Term Goals:  Timeframe for Short Term Goals: (5 days 3/9/23)  Goal 1: The patient will tolerate recommended diet with no clinical s/s of aspiration 5/5  Goal 2: The patient will recall/perform recommended compensatory strategies given min cues    Long Term Goals:   Timeframe for Long Term Goals: (7 days 3/11/23)  Goal 1: The patient will tolerate least restrictive diet with no clinical s/s of aspiration or worsening respiratory/pulmonary status    Treatment:  Skilled instruction completed with patient re: evidenced based practice regarding recommendations and POC, importance of oral care to reduce adverse affects in the event of aspiration, and instruction of recommended compensatory strategies developed based upon clinical exam. Pt able to recall/demonstrate compensatory strategies with min cues. Pt Education: SLP educated the patient re: Role of SLP, rationale for completion of assessment, anatomical components of swallow structures as they pertain to airway protection, results of assessment, and recommendations  Pt Education Response: verbalized understanding and RN aware    Duration/Frequency of Tx: 2-4x/ wk for LOS    Individuals Consulted:   [x]  Patient     []  NP         [x]  RN   []  RD                   []  MD      []  Family Member                        []  PA    []  Other:    Safety Devices / Report:  [x]  All fall risk precautions in place []  Safety handoff completed with RN  [x]  Bed alarm in place  []  Left in bed     []  Chair alarm in place  []  Left in chair   [x]  Call light in reach   []  Other:                  Total Treatment Time / Charges       Time in Time out Total Time / units   Swallow Eval/Tx Time  1329 1353 24 minutes/ 2 units     Signature  [facility], [place]. CF- SLP  Speech Language Pathologist  [id number]

## 2023-03-05 NOTE — ED NOTES
Pt came in today with c/o numbness and tingling on the right side of face and high blood pressure. Pt state that her symptoms started around 0800 at home after she took her blood pressure. Pt state that she felt some heaviness in her right jaw. RN called out for stroke rosalind and MD to bedside. pt denies any cardiac problems in the past. Pt state that all numbness had improved or went away except for the heaviness in her right jaw. Pt c/o dizziness however pt also state that she has horrible vertigo to the point that she had to go to physical therapy. Pt state that the physical therapy helped with her vertigo for awhile.  Pt is awake alert oriented x4     Dedra Clay RN  03/05/23 0309

## 2023-03-05 NOTE — ED NOTES
Patient identified as a positive fall risk on the ED triage fall screening. Patient placed in fall precautions which includes:  yellow fall risk bracelet on wrist and yellow socks on feet. Patient instructed on importance of not getting out of bed or ambulating without assistance for safety. Pt verbalized understanding.       Viridiana Scanlon RN  03/05/23 9764

## 2023-03-05 NOTE — PROGRESS NOTES
TODAYS DATE:  3/5/2023    Discussed personal risk factors for Stroke /TIA with patient/family, and ways to reduce the risk for a recurrent stroke. Patient's personal risk factors which were identified are:     [] Alcohol Abuse: check with your physician before any alcohol consumption. [] Atrial fibrillation: may cause blood clots. [] Drug Abuse: Seek help, talk with your doctor  [] Clotting Disorder  [x] Diabetes  [] Family history of stroke or heart disease  [x] High Blood Pressure/Hypertension: work with your physician. [x] High cholesterol: monitor cholesterol levels with your physician. [x] Overweight/Obesity: work with your physician for your ideal body weight.  [] Physical Inactivity: get regular exercise as directed by your physician. [] Personal history of previous TIA or stroke  [] Poor Diet; decrease salt (sodium) in your diet, follow diet directed by physician. [] Smoking: Cigarette/Cigar: stop smoking. Reviewed the Following Education with Patient and/or Family:   -Signs and Symptoms of Stroke:     (facial droop, weakness/numbness especially on one side, speech difficulty, sudden confusion, sudden loss of vision, sudden severe headache,       sudden loss of balance or having difficulty walking, syncope or seizure)  -How to activate EMS (911)   -Importance of Follow Up Appointment at Discharge   -Importance of Compliance with Medications Prescribed at Discharge     Pt verbalized understanding.      Family Present during Education: no     Stroke Education Booklet given to patient/family which includes above education: yes     Electronically signed by Jigar Mccracken RN on 3/5/2023 at 2:37 PM

## 2023-03-05 NOTE — ED NOTES
8892 - Dr Mina Houser activated pt as a code stroke  9755 - called CT, table 2 open  0944 - called  stroke team   1000 - called lab  1 - Dr Mat Loera called back to speak with Dr Kelvin Woodward  03/05/23 9382

## 2023-03-05 NOTE — CONSULTS
Consult placed    Who: Dr. Prisca Lopez, Neurology  Date:3/5/2023,  Time:1:39 PM        Electronically signed by Chris Sports on 3/5/2023 at 1:39 PM

## 2023-03-06 VITALS
TEMPERATURE: 97.8 F | OXYGEN SATURATION: 97 % | SYSTOLIC BLOOD PRESSURE: 109 MMHG | WEIGHT: 232 LBS | RESPIRATION RATE: 16 BRPM | BODY MASS INDEX: 39.61 KG/M2 | HEART RATE: 77 BPM | DIASTOLIC BLOOD PRESSURE: 75 MMHG | HEIGHT: 64 IN

## 2023-03-06 LAB
CHOLESTEROL, TOTAL: 278 MG/DL (ref 0–199)
EKG ATRIAL RATE: 68 BPM
EKG DIAGNOSIS: NORMAL
EKG P AXIS: 24 DEGREES
EKG P-R INTERVAL: 164 MS
EKG Q-T INTERVAL: 400 MS
EKG QRS DURATION: 82 MS
EKG QTC CALCULATION (BAZETT): 425 MS
EKG R AXIS: 5 DEGREES
EKG T AXIS: 20 DEGREES
EKG VENTRICULAR RATE: 68 BPM
ESTIMATED AVERAGE GLUCOSE: 128.4 MG/DL
HBA1C MFR BLD: 6.1 %
HCT VFR BLD CALC: 39.2 % (ref 36–48)
HDLC SERPL-MCNC: 42 MG/DL (ref 40–60)
HEMOGLOBIN: 13.4 G/DL (ref 12–16)
LDL CHOLESTEROL CALCULATED: 207 MG/DL
LV EF: 58 %
LVEF MODALITY: NORMAL
MCH RBC QN AUTO: 31.4 PG (ref 26–34)
MCHC RBC AUTO-ENTMCNC: 34.1 G/DL (ref 31–36)
MCV RBC AUTO: 91.9 FL (ref 80–100)
PDW BLD-RTO: 13.7 % (ref 12.4–15.4)
PLATELET # BLD: 296 K/UL (ref 135–450)
PMV BLD AUTO: 8.5 FL (ref 5–10.5)
RBC # BLD: 4.26 M/UL (ref 4–5.2)
TRIGL SERPL-MCNC: 147 MG/DL (ref 0–150)
VLDLC SERPL CALC-MCNC: 29 MG/DL
WBC # BLD: 7.3 K/UL (ref 4–11)

## 2023-03-06 PROCEDURE — 6360000002 HC RX W HCPCS: Performed by: INTERNAL MEDICINE

## 2023-03-06 PROCEDURE — 85027 COMPLETE CBC AUTOMATED: CPT

## 2023-03-06 PROCEDURE — 80061 LIPID PANEL: CPT

## 2023-03-06 PROCEDURE — 36415 COLL VENOUS BLD VENIPUNCTURE: CPT

## 2023-03-06 PROCEDURE — 83036 HEMOGLOBIN GLYCOSYLATED A1C: CPT

## 2023-03-06 PROCEDURE — 6370000000 HC RX 637 (ALT 250 FOR IP): Performed by: INTERNAL MEDICINE

## 2023-03-06 PROCEDURE — 99223 1ST HOSP IP/OBS HIGH 75: CPT | Performed by: PSYCHIATRY & NEUROLOGY

## 2023-03-06 PROCEDURE — 93005 ELECTROCARDIOGRAM TRACING: CPT | Performed by: INTERNAL MEDICINE

## 2023-03-06 PROCEDURE — 93306 TTE W/DOPPLER COMPLETE: CPT

## 2023-03-06 RX ORDER — LISINOPRIL 20 MG/1
20 TABLET ORAL DAILY
Qty: 30 TABLET | Refills: 3 | Status: SHIPPED | OUTPATIENT
Start: 2023-03-07

## 2023-03-06 RX ORDER — ASPIRIN 81 MG/1
81 TABLET ORAL DAILY
Qty: 30 TABLET | Refills: 3 | Status: SHIPPED | OUTPATIENT
Start: 2023-03-07

## 2023-03-06 RX ORDER — ATORVASTATIN CALCIUM 20 MG/1
20 TABLET, FILM COATED ORAL DAILY
Qty: 30 TABLET | Refills: 3 | Status: SHIPPED | OUTPATIENT
Start: 2023-03-06

## 2023-03-06 RX ADMIN — ASPIRIN 81 MG: 81 TABLET, COATED ORAL at 10:01

## 2023-03-06 RX ADMIN — PANTOPRAZOLE SODIUM 40 MG: 40 TABLET, DELAYED RELEASE ORAL at 05:35

## 2023-03-06 RX ADMIN — ENOXAPARIN SODIUM 30 MG: 100 INJECTION SUBCUTANEOUS at 10:01

## 2023-03-06 RX ADMIN — LISINOPRIL 20 MG: 20 TABLET ORAL at 10:02

## 2023-03-06 RX ADMIN — ATORVASTATIN CALCIUM 20 MG: 10 TABLET, FILM COATED ORAL at 10:01

## 2023-03-06 ASSESSMENT — PAIN SCALES - GENERAL
PAINLEVEL_OUTOF10: 0
PAINLEVEL_OUTOF10: 0

## 2023-03-06 NOTE — PROGRESS NOTES
Occupational Therapy  OT orders received, chart reviewed. Per PT note, pt IND with functional mobility. Spoke with pt in room (pt seated on couch, cleared to be IND within the room). Pt declining need for OT evaluation. Education provided on role/purpose of OT services. Pt reporting IND with toileting and dressing. Skilled OT services are not warranted at this time. Will sign off. Please re-order if there is a change in functional status.      Thank you,    Tia Goldmann, OTR/L

## 2023-03-06 NOTE — TELEPHONE ENCOUNTER
Left message for Donaldo Mello to call the office back. Labs will be drawn either at the time of the appointment or after the appointment.

## 2023-03-06 NOTE — CONSULTS
In patient Neurology consult        [facility] Neurology      MD Laxmi Shafferdesi Jeff  1965    Date of Service: 3/6/2023    Referring Physician: Jeanne Steel MD      Reason for the consult and CC: Acute right-sided paresthesias    HPI:   The patient is a 61 y.o.   female, with a PMH of HTN, HLD, and morbid obesity, who presented to [facility] with acute right-sided paresthesias affecting her face and arm. The patient has been monitoring her BP at home and noted it was 205 at home. She went to urgent care and was started on lisinopril 20 mg daily. Her BP initially improved, but then was back up into the 180s. She then noticed the right side of her face and her right arm seemed numb. This lasted for a few hours, but is now back to normal.  She denies fever, chills, headache, dizziness, vision changes, dysarthria, dysphagia, tinnitus, focal weakness. Constitutional:   Vitals:    03/06/23 0300 03/06/23 0800 03/06/23 1000 03/06/23 1200   BP: 104/61 123/78 134/80 134/84   Pulse: 78 71 77 75   Resp: 16 16 16 16   Temp: 97.6 °F (36.4 °C) 98 °F (36.7 °C) 98.2 °F (36.8 °C) 97.7 °F (36.5 °C)   TempSrc: Oral Oral Oral Oral   SpO2: 96% 95% 97% 95%   Weight:       Height:             I personally reviewed and updated social history, past medical history, medications, allergy, surgical history, and family history as documented in the patient's electronic health records. ROS: 10-14 ROS reviewed with the patient/nurse/family which were unremarkable except mentioned in H&P. General appearance:  Normal development and appear in no acute distress. Mental Status:   Oriented to person, place, problem, and time. Memory: Good immediate recall. Intact remote memory  Normal attention span and concentration. Language: intact naming, repeating and fluency   Good fund of Knowledge. Aware of current events and vocabulary   Cranial Nerves:   II: Visual fields: Full.  Pupils: equal, round, reactive to light, bilaterally  III,IV,VI: Extra Ocular Movements are intact. No nystagmus  V: Facial sensation is intact  VII: Facial strength and movements: intact and symmetric  IX: Normal palatal elevation and shoulder shrug  XII: Tongue movements are normal  Musculoskeletal: 5/5 in all 4 extremities. Good range of motion. No muscle atrophy. Tone: Normal tone. Reflexes: Symmetric 2+ in the arms and 2+ in the legs   Planters: Flexor bilaterally  Coordination: no pronator drift, no dysmetria with FNF and normal REM  Sensation: normal in both arms and legs. Gait/Posture: steady gait with normal posturing and station. Medical decision making:  Data: reviewed   LABS:   Lab Results   Component Value Date/Time     03/05/2023 10:00 AM    K 4.0 03/05/2023 10:00 AM    CL 97 03/05/2023 10:00 AM    CO2 26 03/05/2023 10:00 AM    BUN 12 03/05/2023 10:00 AM    CREATININE 0.8 03/05/2023 10:00 AM    GFRAA >60 08/30/2021 03:37 AM    LABGLOM >60 03/05/2023 10:00 AM    GLUCOSE 137 03/05/2023 10:00 AM    CALCIUM 9.9 03/05/2023 10:00 AM     Lab Results   Component Value Date/Time    WBC 7.3 03/06/2023 05:53 AM    RBC 4.26 03/06/2023 05:53 AM    HGB 13.4 03/06/2023 05:53 AM    HCT 39.2 03/06/2023 05:53 AM    MCV 91.9 03/06/2023 05:53 AM    RDW 13.7 03/06/2023 05:53 AM     03/06/2023 05:53 AM     Lab Results   Component Value Date    INR 1.02 03/05/2023    PROTIME 13.3 03/05/2023       Neuroimaging was independently reviewed by myself and discussed results with the patient  Reviewed notes from different physicians including H&P and ED notes. Reviewed lab and blood testing    Impression:    Acute right-sided paresthesias - likely TIA from uncontrolled HTN. CT head negative. CTA head/neck without LVO. MRI of brain was negative for acute infarct. HTN, uncontrolled. Morbid obesity  Likely REKHA    Recommendation:     ECHO pending. Monitor on tele. Initiated on ASA and should continue at dc. Continue statin. Continue home BP meds. F/u A1c, lipid panel. PT/OT/SLP  Recommend outpatient sleep study to evaluate for REKHA. Thank you for referring such patient. If you have any questions regarding my consult note, please don't hesitate to call me. Art Borrero, CNP    Attending Supervising [de-identified] Attestation Statement      The patient was seen 3/6/2023 in conjunction with the nurse practitioner with independent history, evaluation and examination. I agree with the note which has been adjusted to reflect my findings, with the addition of the following: The patient is 61 y.o.  female  who was admitted for acute right-sided paresthesia and vertigo. Symptoms started the day of admission. Description sudden right arm and leg numbness and tingling followed by dizziness and lightheadedness. Degree was severe and persistent. She checks her blood pressure at home which was above 200. No chest pain or headache. No speech impairment. She came to the ED for evaluation. Initial imaging showed no acute stroke or large vessel occlusion. She was admitted. Today she feels better. Back to her baseline. No residual deficit. MRI showed no acute stroke and showed  Chiari malformation. Other review of system was unremarkable. On examination:  No acute distress  Awake and alert x3. Fluent speech. Appears appropriate with intact recent and remote memory. Pupil reactive and symmetric, extraocular motor intact, no ophthalmoplegia, face is symmetric and tongue is midline  No focal weakness with symmetric DTR  Normal tone  No sensory disturbance or abnormal movement    Impression:  Acute right-sided paresthesia likely due to hypertensive urgency and TIA  Hypertension, not controlled  Chiari malformation, incidental finding. The patient is currently asymptomatic. No need for further intervention.       Recommendation:  Stroke education provided to the patient today  Echo  Aspirin  Statin  Blood pressure control at home  Continue current blood pressure medications  Follow A1c and LDL  PT, OT and speech  Telemetry  DVT and GI prophylaxis  Monitor blood pressure closely at home  Vestibular precautions  Can be discharged once medically stable      Electronically signed by Franko Avila MD on 3/6/23 at 12:59 PM EST       This dictation was generated by voice recognition computer software.  Although all attempts are made to edit the dictation for accuracy, there may be errors in the  transcription that are not intended

## 2023-03-06 NOTE — DISCHARGE SUMMARY
Hospital Medicine Discharge Summary    Patient ID: Pati Kearns      Patient's PCP: Mj Adames, APRN - CNP    Admit Date: 3/5/2023     Discharge Date:  3/6/ 2023    Admitting Provider: Belia Kee MD     Discharge Provider: Etta Herrera MD     Discharge Diagnoses: Active Hospital Problems    Diagnosis     TIA (transient ischemic attack) [G45.9]      Priority: Medium       The patient was seen and examined on day of discharge and this discharge summary is in conjunction with any daily progress note from day of discharge. Hospital Course:      TIA. Patient will be started on aspirin and statin. MRI  neg for acute ischemia . CT and CTA unremarkable. Echocardiogram ordered. Neuro consulted. .no right sided numbness now, echo pending   2. Irregular heartbeat. Patient states she does have history of irregular heartbeat. She states she does not have any documented history of atrial fibrillation. Atrial fibrillation does run in her family. Tele - NO afib, currently regular , advised sleep study out pt   3. Atypical migraines. Patient denies any associated headache currently. 4.  Uncontrolled hypertension. Blood pressure improved at this visit. Continue lisinopril. Will monitor closely. I5. Chiari malformation, incidental finding. The patient is currently asymptomatic. No need for further intervention        Physical Exam Performed:     /84   Pulse 75   Temp 97.7 °F (36.5 °C) (Oral)   Resp 16   Ht 5' 3.5\" (1.613 m)   Wt 232 lb (105.2 kg)   LMP 11/02/2015   SpO2 95%   BMI 40.45 kg/m²       General appearance:  No apparent distress, appears stated age and cooperative. HEENT:  Normal cephalic, atraumatic without obvious deformity. .  Extra ocular muscles intact. Conjunctivae/corneas clear. Neck: Supple, with full range of motion. No jugular venous distention. Trachea midline. Respiratory:  Normal respiratory effort.  Clear to auscultation, bilaterally without Rales/Wheezes/Rhonchi. Cardiovascular:  Regular rate and rhythm with normal S1/S2 without murmurs, rubs or gallops. Abdomen: Soft, non-tender, non-distended with normal bowel sounds. Musculoskeletal:  No clubbing, cyanosis or edema bilaterally. Full range of motion without deformity. Skin: Skin color, texture, turgor normal.  No rashes or lesions. Neurologic:  Neurovascularly intact without any focal sensory/motor deficits. Psychiatric:  Alert and oriented, thought content appropriate, normal insight  Capillary Refill: Brisk,< 3 seconds   Peripheral Pulses: +2 palpable, equal bilaterally       Labs: For convenience and continuity at follow-up the following most recent labs are provided:      CBC:    Lab Results   Component Value Date/Time    WBC 7.3 03/06/2023 05:53 AM    HGB 13.4 03/06/2023 05:53 AM    HCT 39.2 03/06/2023 05:53 AM     03/06/2023 05:53 AM       Renal:    Lab Results   Component Value Date/Time     03/05/2023 10:00 AM    K 4.0 03/05/2023 10:00 AM    CL 97 03/05/2023 10:00 AM    CO2 26 03/05/2023 10:00 AM    BUN 12 03/05/2023 10:00 AM    CREATININE 0.8 03/05/2023 10:00 AM    CALCIUM 9.9 03/05/2023 10:00 AM         Significant Diagnostic Studies    Radiology:   MRI brain without contrast   Final Result   No acute infarct. Low-lying cerebellar tonsils, consistent with Chiari 1 malformation versus   benign cerebellar tonsillar ectopia. CTA HEAD NECK W CONTRAST   Final Result   No flow limiting stenosis or occlusion within the head or neck. CT HEAD WO CONTRAST   Final Result   No acute intracranial abnormality. The findings were sent to the Radiology Results [address] at 10:22   am on 3/5/2023 to be communicated to a licensed caregiver.                 Consults:     IP CONSULT TO NEUROLOGY    Disposition:  home     Condition at Discharge: Stable    Discharge Instructions/Follow-up: PCP    Code Status:  Full Code     Activity: activity as tolerated    Diet: low fat, low cholesterol diet      Discharge Medications:     Current Discharge Medication List             Details   aspirin 81 MG EC tablet Take 1 tablet by mouth daily  Qty: 30 tablet, Refills: 3      lisinopril (PRINIVIL;ZESTRIL) 20 MG tablet Take 1 tablet by mouth daily  Qty: 30 tablet, Refills: 3                Details   atorvastatin (LIPITOR) 20 MG tablet Take 1 tablet by mouth daily  Qty: 30 tablet, Refills: 3                Details   albuterol sulfate HFA (PROVENTIL HFA) 108 (90 Base) MCG/ACT inhaler Inhale 2 puffs into the lungs every 6 hours as needed for Wheezing  Qty: 18 g, Refills: 3    Associated Diagnoses: Bronchitis      omeprazole (PRILOSEC) 40 MG delayed release capsule Take 1 capsule by mouth daily  Qty: 90 capsule, Refills: 1    Associated Diagnoses: Gastroesophageal reflux disease, unspecified whether esophagitis present      vitamin D (VITAMIN D3 MAXIMUM STRENGTH) 125 MCG (5000 UT) CAPS capsule       FLOVENT  MCG/ACT inhaler       Blood Pressure KIT 1 kit by Does not apply route 2 times daily  Qty: 1 kit, Refills: 0    Associated Diagnoses: Hypertension, unspecified type             Time Spent on discharge: 34 minin the examination, evaluation, counseling and review of medications and discharge plan. Signed:    Rosemary York MD   3/6/2023      Thank you MARIAH Huynh CNP for the opportunity to be involved in this patient's care. If you have any questions or concerns, please feel free to contact me at [phone].

## 2023-03-06 NOTE — CARE COORDINATION
Case Management Assessment  Initial Evaluation    Date/Time of Evaluation: 3/6/2023 3:02 PM  Assessment Completed by: Smita Perkins RN    If patient is discharged prior to next notation, then this note serves as note for discharge by case management. Patient Name: Manuel Laird                   YOB: 1965  Diagnosis: TIA (transient ischemic attack) [G45.9]  Stroke-like symptoms [R29.90]  Chest pain, unspecified type [R07.9]                   Date / Time: 3/5/2023  9:46 AM    Patient Admission Status: Inpatient   Readmission Risk (Low < 19, Mod (19-27), High > 27): Readmission Risk Score: 2.7    Current PCP: MARIAH Kuo CNP  PCP verified by CM? Yes    Chart Reviewed: Yes      History Provided by: Patient  Patient Orientation: Alert and Oriented    Patient Cognition: Alert    Hospitalization in the last 30 days (Readmission):  No    If yes, Readmission Assessment in CM Navigator will be completed. Advance Directives:      Code Status: Full Code   Patient's Primary Decision Maker is: Patient Declined (Legal Next of Kin Remains as Decision Maker)      Discharge Planning:    Patient lives with: Parent Type of Home: House  Primary Care Giver: Self  Patient Support Systems include: Parent, Friends/Neighbors, Family Members   Current Financial resources: None  Current community resources: None  Current services prior to admission: None            Current DME:              Type of Home Care services:  None    ADLS  Prior functional level: Independent in ADLs/IADLs  Current functional level: Independent in ADLs/IADLs    PT AM-PAC: 24 /24  OT AM-PAC:   /24    Family can provide assistance at DC: Yes  Would you like Case Management to discuss the discharge plan with any other family members/significant others, and if so, who?  No  Plans to Return to Present Housing: Yes  Other Identified Issues/Barriers to RETURNING to current housing: none  Potential Assistance needed at discharge: N/A Potential DME:    Patient expects to discharge to: [address] [facility] for transportation at discharge: Self    Financial    Payor: UMR / Plan: UMR  / Product Type: *No Product type* /     Does insurance require precert for SNF: Yes    Potential assistance Purchasing Medications: No  [facility] [id number] [address]j Schmidt, [address] [facility] [phone] [facility] [place] [facility] [place] [phone]  [address] [address]  Phone: [phone] Fax: [phone]    [facility] [address] [facility], [address] [place], [place] [address] [phone] [facility] [place] [facility] [place] [address] [address]  Phone: [phone] Fax: [phone]      Notes:    Factors facilitating achievement of predicted outcomes: Family support    Barriers to discharge: none    Additional Case Management Notes: Patient lives with her parents, and is IPTA. Patient is an active , and has no issues getting to follow up appts, or getting meds. The Plan for Transition of Care is related to the following treatment goals of TIA (transient ischemic attack) [G45.9]  Stroke-like symptoms [R29.90]  Chest pain, unspecified type [N19.7]    IF APPLICABLE: The Patient and/or patient representative Elizabeth Rodriguez and her family were provided with a choice of provider and agrees with the discharge plan. Freedom of choice list with basic dialogue that supports the patient's individualized plan of care/goals and shares the quality data associated with the providers was provided to: Patient   Patient Representative Name:       The Patient and/or Patient Representative Agree with the Discharge Plan?  Yes    Smita Perkins RN  Case Management Department  Ph: [phone] Fax: [phone]

## 2023-03-06 NOTE — PROGRESS NOTES
Patient given discharge instructions. All questions and concerns were addressed. Patient IV removed and dressing placed. Patient wheeled to car with all patient belongings and prescriptions.

## 2023-03-07 ENCOUNTER — TELEPHONE (OUTPATIENT)
Dept: FAMILY MEDICINE CLINIC | Age: 58
End: 2023-03-07

## 2023-03-07 NOTE — TELEPHONE ENCOUNTER
[facility] Transitions Initial Follow Up Call    Outreach made within 2 business days of discharge: Yes    Patient: Rodolfo Zavala Patient : 1965   MRN: [id number]  Reason for Admission: There are no discharge diagnoses documented for the most recent discharge. Discharge Date: 3/6/23       Spoke with: PT scheduled TCM with provider, PT scheduled 2023, offered sooner Appt but PT stated she is going out of town. Discharge department/facility: [facility]    Non-face-to-face services provided:  Scheduled appointment with PCP-2023  Obtained and reviewed discharge summary and/or continuity of care documents    TCM Interactive Patient Contact:  Was patient able to fill all prescriptions: Yes  Was patient instructed to bring all medications to the follow-up visit: Yes  Is patient taking all medications as directed in the discharge summary?  Yes  Does patient understand their discharge instructions: Yes  Does patient have questions or concerns that need addressed prior to 7-14 day follow up office visit: no    Scheduled appointment with PCP within 7-14 days    Follow Up  Future Appointments   Date Time Provider Maura Montano   3/14/2023 10:00 AM MARIAH Guthrie CNP  [place] [facility]   3/21/2023 11:30 AM MARIAH Guthrie CNP [address]       Guille Egan LPN

## 2023-03-14 ENCOUNTER — OFFICE VISIT (OUTPATIENT)
Dept: FAMILY MEDICINE CLINIC | Age: 58
End: 2023-03-14

## 2023-03-14 VITALS
DIASTOLIC BLOOD PRESSURE: 66 MMHG | WEIGHT: 240 LBS | HEIGHT: 63 IN | OXYGEN SATURATION: 98 % | SYSTOLIC BLOOD PRESSURE: 118 MMHG | HEART RATE: 74 BPM | BODY MASS INDEX: 42.52 KG/M2

## 2023-03-14 DIAGNOSIS — Z09 HOSPITAL DISCHARGE FOLLOW-UP: Primary | ICD-10-CM

## 2023-03-14 DIAGNOSIS — I10 HYPERTENSION, UNSPECIFIED TYPE: ICD-10-CM

## 2023-03-14 DIAGNOSIS — H69.91 EUSTACHIAN TUBE DISORDER, RIGHT: ICD-10-CM

## 2023-03-14 DIAGNOSIS — R73.03 PREDIABETES: ICD-10-CM

## 2023-03-14 DIAGNOSIS — H61.23 BILATERAL IMPACTED CERUMEN: ICD-10-CM

## 2023-03-14 DIAGNOSIS — R74.8 ELEVATED LIVER ENZYMES: ICD-10-CM

## 2023-03-14 DIAGNOSIS — R79.89 ELEVATED TSH: ICD-10-CM

## 2023-03-14 DIAGNOSIS — E78.2 MIXED HYPERLIPIDEMIA: ICD-10-CM

## 2023-03-14 DIAGNOSIS — K21.9 GASTROESOPHAGEAL REFLUX DISEASE, UNSPECIFIED WHETHER ESOPHAGITIS PRESENT: ICD-10-CM

## 2023-03-14 RX ORDER — ATORVASTATIN CALCIUM 40 MG/1
40 TABLET, FILM COATED ORAL DAILY
Qty: 90 TABLET | Refills: 1 | Status: SHIPPED | OUTPATIENT
Start: 2023-03-14 | End: 2023-06-12

## 2023-03-14 RX ORDER — OMEPRAZOLE 40 MG/1
40 CAPSULE, DELAYED RELEASE ORAL DAILY
Qty: 90 CAPSULE | Refills: 1 | Status: SHIPPED | OUTPATIENT
Start: 2023-03-14

## 2023-03-14 RX ORDER — TRIAMCINOLONE ACETONIDE 55 UG/1
2 SPRAY, METERED NASAL DAILY
Qty: 1 EACH | Refills: 1 | Status: SHIPPED | OUTPATIENT
Start: 2023-03-14

## 2023-03-14 RX ORDER — LISINOPRIL 20 MG/1
20 TABLET ORAL DAILY
Qty: 90 TABLET | Refills: 1 | Status: SHIPPED | OUTPATIENT
Start: 2023-03-14 | End: 2023-06-12

## 2023-03-14 SDOH — ECONOMIC STABILITY: INCOME INSECURITY: HOW HARD IS IT FOR YOU TO PAY FOR THE VERY BASICS LIKE FOOD, HOUSING, MEDICAL CARE, AND HEATING?: NOT HARD AT ALL

## 2023-03-14 SDOH — ECONOMIC STABILITY: FOOD INSECURITY: WITHIN THE PAST 12 MONTHS, THE FOOD YOU BOUGHT JUST DIDN'T LAST AND YOU DIDN'T HAVE MONEY TO GET MORE.: NEVER TRUE

## 2023-03-14 SDOH — ECONOMIC STABILITY: FOOD INSECURITY: WITHIN THE PAST 12 MONTHS, YOU WORRIED THAT YOUR FOOD WOULD RUN OUT BEFORE YOU GOT MONEY TO BUY MORE.: NEVER TRUE

## 2023-03-14 SDOH — ECONOMIC STABILITY: HOUSING INSECURITY
IN THE LAST 12 MONTHS, WAS THERE A TIME WHEN YOU DID NOT HAVE A STEADY PLACE TO SLEEP OR SLEPT IN A SHELTER (INCLUDING NOW)?: NO

## 2023-03-14 ASSESSMENT — ANXIETY QUESTIONNAIRES
7. FEELING AFRAID AS IF SOMETHING AWFUL MIGHT HAPPEN: 0
3. WORRYING TOO MUCH ABOUT DIFFERENT THINGS: 0
1. FEELING NERVOUS, ANXIOUS, OR ON EDGE: 0
4. TROUBLE RELAXING: 0
5. BEING SO RESTLESS THAT IT IS HARD TO SIT STILL: 0
2. NOT BEING ABLE TO STOP OR CONTROL WORRYING: 0
GAD7 TOTAL SCORE: 0
6. BECOMING EASILY ANNOYED OR IRRITABLE: 0
IF YOU CHECKED OFF ANY PROBLEMS ON THIS QUESTIONNAIRE, HOW DIFFICULT HAVE THESE PROBLEMS MADE IT FOR YOU TO DO YOUR WORK, TAKE CARE OF THINGS AT HOME, OR GET ALONG WITH OTHER PEOPLE: NOT DIFFICULT AT ALL

## 2023-03-14 ASSESSMENT — PATIENT HEALTH QUESTIONNAIRE - PHQ9
1. LITTLE INTEREST OR PLEASURE IN DOING THINGS: 0
SUM OF ALL RESPONSES TO PHQ QUESTIONS 1-9: 0
SUM OF ALL RESPONSES TO PHQ9 QUESTIONS 1 & 2: 0
SUM OF ALL RESPONSES TO PHQ QUESTIONS 1-9: 0
2. FEELING DOWN, DEPRESSED OR HOPELESS: 0

## 2023-03-14 NOTE — PROGRESS NOTES
Post-Discharge Transitional Care  Follow Up      Maciej Hernandez   YOB: 1965    Date of Office Visit:  3/14/2023  Date of Hospital Admission: 3/5/23  Date of Hospital Discharge: 3/6/23  Risk of hospital readmission (high >=14%. Medium >=10%) :Readmission Risk Score: 3.3      Care management risk score Rising risk (score 2-5) and Complex Care (Scores >=6): No Risk Score On File     Non face to face  following discharge, date last encounter closed (first attempt may have been earlier): 03/07/2023    Call initiated 2 business days of discharge: Yes    ASSESSMENT/PLAN:   Hospital discharge follow-up  Pt states that she is doing well. Symptoms resolved. -     [place] DISCHARGE MEDS RECONCILED W/ CURRENT OUTPATIENT MED LIST    Mixed hyperlipidemia  Fasting lipid panel done during hospitalization (3/6/23). LDL remains above 190. Will increase statin to 40 mg daily. Reminded pt to take in the evening. Will recheck fasting labs at 6 month f/u appt. -     atorvastatin (LIPITOR) 40 MG tablet; Take 1 tablet by mouth daily, Disp-90 tablet, R-1Normal  -     Hepatic Function Panel    Prediabetes  A1C is up to 6.1. Pt declines starting medication for pre diabetes. She would like to try working on lifestyle modifications. Will repeat A1C at 6 month f/u appt. Elevated TSH  H/o elevated TSH without diagnosis of hypothyroidism. T4 has been WNL's. See OV note from 10/13/2021. Pt was symptomatic, but declined starting medication. She did not return for repeat labs. Will recheck today.     -     TSH with Reflex    Hypertension, unspecified type  BP is stable. Continue medication as directed. -     lisinopril (PRINIVIL;ZESTRIL) 20 MG tablet; Take 1 tablet by mouth daily, Disp-90 tablet, R-1Normal    Elevated liver enzymes  Did not return for repeat labs. Will recheck today. Will f/u with pt regarding results and plan.       -     Hepatic Function Panel    Eustachian tube disorder, right  Encouraged pt to use Nasacort as directed for at least 2 weeks, as well as an OTC antihistamine such as Claritin or Allegra. F/u with me if symptoms persist or worsen. -     triamcinolone (NASACORT) 55 MCG/ACT nasal inhaler; 2 sprays by Each Nostril route daily, Disp-1 each, R-1Normal    Bilateral impacted cerumen  Pt tolerated procedure well. S/p ear wax removal bilateral TM's are clear and intact. Reminded the pt not to use Q-tip and that she can use OTC Debrox as needed. -     89134 - [place] REMOVE IMPACTED EAR WAX  -     55706 - [place] REMOVE IMPACTED EAR WAX    Gastroesophageal reflux disease, unspecified whether esophagitis present  Stable. Pt requests med refill.     -     omeprazole (PRILOSEC) 40 MG delayed release capsule; Take 1 capsule by mouth daily, Disp-90 capsule, R-1Normal    Medical Decision Making: moderate complexity  Return in about 6 months (around 9/14/2023) for Annual physical, Hypertension, Hyperlipidemia and prediabetes - with fasting labs. On this date 3/14/2023 I have spent 50 minutes reviewing previous notes, test results and face to face with the patient discussing the diagnosis and importance of compliance with the treatment plan as well as documenting on the day of the visit. Subjective:   HPI:  Follow up of Hospital problems/diagnosis(es): Right sided paresthesias, TIA, HNN. Inpatient course: Discharge summary reviewed- see chart. Hospital Course:       TIA. Patient will be started on aspirin and statin. MRI  neg for acute ischemia . CT and CTA unremarkable. Echocardiogram ordered. Neuro consulted. .no right sided numbness now, echo pending   2. Irregular heartbeat. Patient states she does have history of irregular heartbeat. She states she does not have any documented history of atrial fibrillation. Atrial fibrillation does run in her family. Tele - NO afib, currently regular , advised sleep study out pt   3. Atypical migraines.   Patient denies any associated headache currently. 4.  Uncontrolled hypertension. Blood pressure improved at this visit. Continue lisinopril. Will monitor closely. I5. Chiari malformation, incidental finding. The patient is currently asymptomatic. No need for further intervention    Interval history/Current status: Pt states that her symptoms have resolved. Monitoring BP at home, states that it is running around 115/74. Taking lisinopril 20 mg daily. Also taking statin and ASA as advised. Last OV with pt was 10/13/2021. Was due for f/u with me 2 months ago for prediabetes, elevated TSH, elevated liver enzymes, HLD and HTN. Labs done during hospitalization. TC was down from 302 to 278, triglycerides were down from 180 to 147 and LDL was down from 217 to 207. A1C increased from 5.9 to 6.1 (3/6/23). H/o prediabetes. Not taking medication. Thyroid and liver enzymes were not checked. Pt states that she does have occasional dizziness, ear fullness and PND. She states that the Neurologist that saw her in the hospital told her that her right sided facial numbness and tingling \"could be d/t a problem with her right ear, but no one looked in her ears\".       The 10-year ASCVD risk score (Eugene TEJADA, et al., 2019) is: 5.1%    Values used to calculate the score:      Age: 61 years      Sex: Female      Is Non- : No      Diabetic: No      Tobacco smoker: No      Systolic Blood Pressure: 009 mmHg      Is BP treated: Yes      HDL Cholesterol: 42 mg/dL      Total Cholesterol: 278 mg/dL      Patient Active Problem List   Diagnosis    Lateral meniscus tear    Localized osteoarthrosis, lower leg    Other viral warts    Essential tremor    Adiposity    Plantar fasciitis, right    Sprain of right ankle    Right ankle pain    Ankle pain    Plantar fasciitis, left    Hypertension    TIA (transient ischemic attack)       Medications listed as ordered at the time of discharge from hospital     Medication List Accurate as of March 14, 2023 11:59 PM. If you have any questions, ask your nurse or doctor.                 START taking these medications      triamcinolone 55 MCG/ACT nasal inhaler  Commonly known as: NASACORT  2 sprays by Each Nostril route daily  Started by: MARIAH Bai CNP            CHANGE how you take these medications      atorvastatin 40 MG tablet  Commonly known as: Lipitor  Take 1 tablet by mouth daily  What changed:   medication strength  how much to take  Changed by: MARIAH Bai CNP            CONTINUE taking these medications      albuterol sulfate  (90 Base) MCG/ACT inhaler  Commonly known as: Proventil HFA  Inhale 2 puffs into the lungs every 6 hours as needed for Wheezing     aspirin 81 MG EC tablet  Take 1 tablet by mouth daily     Blood Pressure Kit  1 kit by Does not apply route 2 times daily     Flovent  MCG/ACT inhaler  Generic drug: fluticasone     lisinopril 20 MG tablet  Commonly known as: PRINIVIL;ZESTRIL  Take 1 tablet by mouth daily     omeprazole 40 MG delayed release capsule  Commonly known as: PRILOSEC  Take 1 capsule by mouth daily     Vitamin D3 Maximum Strength 125 MCG (5000 UT) Caps capsule  Generic drug: vitamin D               Where to Get Your Medications        These medications were sent to [place] [id number]      Phone: [phone]   atorvastatin 40 MG tablet  lisinopril 20 MG tablet  omeprazole 40 MG delayed release capsule       These medications were sent to [facility] [id number] [facility] [address] [facility] [phone] Jourdan Senior [phone]  [address] [place] [address]      Phone: [phone]   triamcinolone 55 MCG/ACT nasal inhaler           Medications marked \"taking\" at this time  Outpatient Medications Marked as Taking for the 3/14/23 encounter (Office Visit) with MARIAH Newman - CNP   Medication Sig Dispense Refill    atorvastatin (LIPITOR) 40 MG tablet Take 1 tablet by mouth daily 90 tablet 1    triamcinolone (NASACORT) 55 MCG/ACT nasal inhaler 2 sprays by Each Nostril route daily 1 each 1    lisinopril (PRINIVIL;ZESTRIL) 20 MG tablet Take 1 tablet by mouth daily 90 tablet 1    omeprazole (PRILOSEC) 40 MG delayed release capsule Take 1 capsule by mouth daily 90 capsule 1    aspirin 81 MG EC tablet Take 1 tablet by mouth daily 30 tablet 3    albuterol sulfate HFA (PROVENTIL HFA) 108 (90 Base) MCG/ACT inhaler Inhale 2 puffs into the lungs every 6 hours as needed for Wheezing 18 g 3    vitamin D (VITAMIN D3 MAXIMUM STRENGTH) 125 MCG (5000 UT) CAPS capsule       FLOVENT  MCG/ACT inhaler       Blood Pressure KIT 1 kit by Does not apply route 2 times daily 1 kit 0        Medications patient taking as of now reconciled against medications ordered at time of hospital discharge: Yes    A comprehensive review of systems was negative except for: Ears, nose, mouth, throat, and face: positive for ear fullness and PND. C/o occasional dizziness, but is not having symptoms at this time.      Objective:    /66 (Site: Right Upper Arm, Position: Sitting, Cuff Size: Large Adult)   Pulse 74   Ht 5' 3\" (1.6 m)   Wt 240 lb (108.9 kg)   LMP 11/02/2015   SpO2 98%   BMI 42.51 kg/m²   General Appearance: alert and oriented to person, place and time, well-developed and well-nourished, in no acute distress  Skin: warm and dry, no rash or erythema  Head: normocephalic and atraumatic  Eyes: pupils equal, round, and reactive to light, extraocular eye movements intact, conjunctivae normal  ENT: bilateral external ear normal, bilateral cerumen impaction noted, oropharynx clear and moist with normal mucous membranes, and sinuses non-tender  Pulmonary/Chest: clear to auscultation bilaterally- no wheezes, rales or rhonchi, normal air movement, no respiratory distress  Cardiovascular: normal rate, normal S1 and S2, no gallops, intact distal pulses, and no carotid bruits  Neurologic: gait and coordination normal and speech normal      An electronic signature was used to authenticate this note.   --MARIAH Crabtree - CNP

## 2023-03-15 LAB
ALBUMIN SERPL-MCNC: 4.3 G/DL (ref 3.4–5)
ALP SERPL-CCNC: 116 U/L (ref 40–129)
ALT SERPL-CCNC: 15 U/L (ref 10–40)
AST SERPL-CCNC: 17 U/L (ref 15–37)
BILIRUB DIRECT SERPL-MCNC: <0.2 MG/DL (ref 0–0.3)
BILIRUB INDIRECT SERPL-MCNC: NORMAL MG/DL (ref 0–1)
BILIRUB SERPL-MCNC: 0.4 MG/DL (ref 0–1)
PROT SERPL-MCNC: 7.3 G/DL (ref 6.4–8.2)
T4 FREE SERPL-MCNC: 0.9 NG/DL (ref 0.9–1.8)
TSH SERPL DL<=0.005 MIU/L-ACNC: 5.18 UIU/ML (ref 0.27–4.2)

## 2023-03-16 DIAGNOSIS — E03.9 HYPOTHYROIDISM, UNSPECIFIED TYPE: Primary | ICD-10-CM

## 2023-03-16 RX ORDER — LEVOTHYROXINE SODIUM 0.03 MG/1
25 TABLET ORAL DAILY
Qty: 30 TABLET | Refills: 1 | Status: SHIPPED | OUTPATIENT
Start: 2023-03-16 | End: 2023-04-15

## 2023-04-04 DIAGNOSIS — I10 HYPERTENSION, UNSPECIFIED TYPE: Primary | ICD-10-CM

## 2023-04-04 RX ORDER — HYDROCHLOROTHIAZIDE 25 MG/1
25 TABLET ORAL EVERY MORNING
Qty: 30 TABLET | Refills: 5 | Status: SHIPPED | OUTPATIENT
Start: 2023-04-04

## 2023-05-04 ENCOUNTER — OFFICE VISIT (OUTPATIENT)
Dept: FAMILY MEDICINE CLINIC | Age: 58
End: 2023-05-04
Payer: COMMERCIAL

## 2023-05-04 VITALS
WEIGHT: 245 LBS | OXYGEN SATURATION: 98 % | DIASTOLIC BLOOD PRESSURE: 70 MMHG | HEIGHT: 64 IN | HEART RATE: 71 BPM | BODY MASS INDEX: 41.83 KG/M2 | SYSTOLIC BLOOD PRESSURE: 130 MMHG

## 2023-05-04 DIAGNOSIS — I10 HYPERTENSION, UNSPECIFIED TYPE: Primary | ICD-10-CM

## 2023-05-04 PROCEDURE — 3078F DIAST BP <80 MM HG: CPT | Performed by: NURSE PRACTITIONER

## 2023-05-04 PROCEDURE — 3075F SYST BP GE 130 - 139MM HG: CPT | Performed by: NURSE PRACTITIONER

## 2023-05-04 PROCEDURE — 99213 OFFICE O/P EST LOW 20 MIN: CPT | Performed by: NURSE PRACTITIONER

## 2023-05-04 RX ORDER — HYDROCHLOROTHIAZIDE 25 MG/1
25 TABLET ORAL EVERY MORNING
Qty: 90 TABLET | Refills: 1 | Status: SHIPPED | OUTPATIENT
Start: 2023-05-04 | End: 2023-10-31

## 2023-05-04 ASSESSMENT — PATIENT HEALTH QUESTIONNAIRE - PHQ9
SUM OF ALL RESPONSES TO PHQ9 QUESTIONS 1 & 2: 0
2. FEELING DOWN, DEPRESSED OR HOPELESS: 0
1. LITTLE INTEREST OR PLEASURE IN DOING THINGS: 0
SUM OF ALL RESPONSES TO PHQ QUESTIONS 1-9: 0

## 2023-05-04 ASSESSMENT — ENCOUNTER SYMPTOMS: RESPIRATORY NEGATIVE: 1

## 2023-05-04 NOTE — PROGRESS NOTES
Weight: 245 lb (111.1 kg)    Height: 5' 3.5\" (1.613 m)      Estimated body mass index is 42.72 kg/m² as calculated from the following:    Height as of this encounter: 5' 3.5\" (1.613 m). Weight as of this encounter: 245 lb (111.1 kg). Physical Exam  Vitals reviewed. Constitutional:       Appearance: Normal appearance. She is obese. HENT:      Head: Normocephalic and atraumatic. Cardiovascular:      Rate and Rhythm: Normal rate and regular rhythm. Heart sounds: Normal heart sounds. Pulmonary:      Effort: Pulmonary effort is normal.      Breath sounds: Normal breath sounds. Skin:     General: Skin is warm and dry. Neurological:      General: No focal deficit present. Mental Status: She is alert and oriented to person, place, and time. Psychiatric:         Mood and Affect: Mood normal.         Behavior: Behavior normal.         Thought Content: Thought content normal.         Judgment: Judgment normal.       ASSESSMENT/PLAN:  1. Hypertension, unspecified type  BP is stable. Cough has resolved since switching from Lisinopril to HCTZ. Continue medication as directed. - Basic Metabolic Panel; Future  - hydroCHLOROthiazide (HYDRODIURIL) 25 MG tablet; Take 1 tablet by mouth every morning  Dispense: 90 tablet; Refill: 1      Return in about 6 months (around 11/4/2023) for Hypertension. An electronic signature was used to authenticate this note.     --MARIAH Bauer - CNP on 5/4/2023 at 10:52 AM

## 2023-07-09 ENCOUNTER — PATIENT MESSAGE (OUTPATIENT)
Dept: FAMILY MEDICINE CLINIC | Age: 58
End: 2023-07-09

## 2023-07-13 NOTE — TELEPHONE ENCOUNTER
I would recommend that she discuss taking this dietary supplement with her pharmacist to make sure that it does not interact with any other medications that she is taking.